# Patient Record
Sex: FEMALE | Race: BLACK OR AFRICAN AMERICAN | Employment: FULL TIME | URBAN - METROPOLITAN AREA
[De-identification: names, ages, dates, MRNs, and addresses within clinical notes are randomized per-mention and may not be internally consistent; named-entity substitution may affect disease eponyms.]

---

## 2017-06-06 ENCOUNTER — HOSPITAL ENCOUNTER (EMERGENCY)
Facility: HOSPITAL | Age: 37
Discharge: HOME | End: 2017-06-07
Attending: EMERGENCY MEDICINE
Payer: COMMERCIAL

## 2017-06-06 DIAGNOSIS — G43.809 OTHER TYPE OF MIGRAINE: Primary | ICD-10-CM

## 2017-06-06 PROCEDURE — 96365 THER/PROPH/DIAG IV INF INIT: CPT

## 2017-06-06 PROCEDURE — 99284 EMERGENCY DEPT VISIT MOD MDM: CPT

## 2017-06-06 PROCEDURE — 96375 TX/PRO/DX INJ NEW DRUG ADDON: CPT

## 2017-06-06 PROCEDURE — 81025 URINE PREGNANCY TEST: CPT

## 2017-06-06 PROCEDURE — 96372 THER/PROPH/DIAG INJ SC/IM: CPT

## 2017-06-06 RX ORDER — DIPHENHYDRAMINE HYDROCHLORIDE 50 MG/ML
25 INJECTION INTRAMUSCULAR; INTRAVENOUS ONCE
Status: COMPLETED | OUTPATIENT
Start: 2017-06-06 | End: 2017-06-06

## 2017-06-06 RX ORDER — SUMATRIPTAN 6 MG/.5ML
6 INJECTION, SOLUTION SUBCUTANEOUS ONCE
Status: COMPLETED | OUTPATIENT
Start: 2017-06-06 | End: 2017-06-06

## 2017-06-06 RX ORDER — DEXAMETHASONE SODIUM PHOSPHATE 4 MG/ML
10 VIAL (ML) INJECTION ONCE
Status: COMPLETED | OUTPATIENT
Start: 2017-06-06 | End: 2017-06-06

## 2017-06-06 RX ORDER — METOCLOPRAMIDE HYDROCHLORIDE 5 MG/ML
10 INJECTION INTRAMUSCULAR; INTRAVENOUS ONCE
Status: COMPLETED | OUTPATIENT
Start: 2017-06-06 | End: 2017-06-06

## 2017-06-06 RX ORDER — TOPIRAMATE 50 MG/1
50 TABLET, FILM COATED ORAL 2 TIMES DAILY
COMMUNITY

## 2017-06-07 VITALS
WEIGHT: 280 LBS | BODY MASS INDEX: 40.09 KG/M2 | SYSTOLIC BLOOD PRESSURE: 128 MMHG | HEART RATE: 49 BPM | HEIGHT: 70 IN | TEMPERATURE: 98 F | RESPIRATION RATE: 15 BRPM | OXYGEN SATURATION: 95 % | DIASTOLIC BLOOD PRESSURE: 76 MMHG

## 2017-06-07 NOTE — ED INITIAL ASSESSMENT (HPI)
Pt ambulatory to er cc headache since yesterday - took norco 7.5 last dose at 1630 today.  Interm nausea+  Ha -10/10

## 2017-06-07 NOTE — ED PROVIDER NOTES
Patient Seen in: [facility] Emergency Department    History   Patient presents with:  Headache (neurologic)  Nausea/Vomiting/Diarrhea (gastrointestinal)    Stated Complaint: headache    HPI    Patient is a 29-year-old female comes in emergency room Giovanna Suarez Mother      ARTHRISIS, OSTEOPOROSIS   • Other[other] [OTHER] Sister      LUPUS   • Giovanna Suarez Sister      MS   • OTHER[other] [OTHER] Sister      CHIARI MALFORMATION         Smoking Status: Never Smoker                      S normal sensation. Cranial nerves grossly intact II-XII. Speech intact.     ED Course     Labs Reviewed   POCT PREGNANCY, URINE     Medications   dexamethasone Sodium Phosphate (DECADRON) 4 MG/ML injection 10 mg (10 mg Intravenous Given 6/6/17 2899)   Valp for worsening, concerning, new, changing or persisting symptoms. I discussed the case with the patient and they had no questions, complaints, or concerns. Patient felt comfortable going home.           Disposition and Plan     Clinical Impression:  Other

## 2017-06-07 NOTE — ED NOTES
Patient is resting comfortably - reports her ha is still at 9/10 - ermd updated - pt states her ride will be here in approx 10 minutes.

## 2024-03-24 ENCOUNTER — HOSPITAL ENCOUNTER (INPATIENT)
Facility: HOSPITAL | Age: 44
LOS: 3 days | Discharge: HOME | End: 2024-03-27
Attending: STUDENT IN AN ORGANIZED HEALTH CARE EDUCATION/TRAINING PROGRAM | Admitting: HOSPITALIST

## 2024-03-24 ENCOUNTER — APPOINTMENT (OUTPATIENT)
Dept: MRI IMAGING | Facility: HOSPITAL | Age: 44
End: 2024-03-24
Attending: STUDENT IN AN ORGANIZED HEALTH CARE EDUCATION/TRAINING PROGRAM

## 2024-03-24 DIAGNOSIS — G95.0 SYRINGOHYDROMYELIA (HCC): ICD-10-CM

## 2024-03-24 DIAGNOSIS — D50.9 IRON DEFICIENCY ANEMIA, UNSPECIFIED IRON DEFICIENCY ANEMIA TYPE: Primary | ICD-10-CM

## 2024-03-24 LAB
ALBUMIN SERPL-MCNC: 3.9 G/DL (ref 3.4–5)
ALBUMIN/GLOB SERPL: 1.1 {RATIO} (ref 1–2)
ALP LIVER SERPL-CCNC: 92 U/L
ALT SERPL-CCNC: 15 U/L
ANION GAP SERPL CALC-SCNC: 6 MMOL/L (ref 0–18)
ANTIBODY SCREEN: NEGATIVE
AST SERPL-CCNC: 20 U/L (ref 15–37)
B-HCG UR QL: NEGATIVE
BASOPHILS # BLD AUTO: 0.06 X10(3) UL (ref 0–0.2)
BASOPHILS NFR BLD AUTO: 1 %
BILIRUB SERPL-MCNC: 0.5 MG/DL (ref 0.1–2)
BUN BLD-MCNC: 8 MG/DL (ref 9–23)
CALCIUM BLD-MCNC: 8.9 MG/DL (ref 8.5–10.1)
CHLORIDE SERPL-SCNC: 115 MMOL/L (ref 98–112)
CO2 SERPL-SCNC: 22 MMOL/L (ref 21–32)
CREAT BLD-MCNC: 0.71 MG/DL
EGFRCR SERPLBLD CKD-EPI 2021: 109 ML/MIN/1.73M2 (ref 60–?)
EOSINOPHIL # BLD AUTO: 0.11 X10(3) UL (ref 0–0.7)
EOSINOPHIL NFR BLD AUTO: 1.8 %
ERYTHROCYTE [DISTWIDTH] IN BLOOD BY AUTOMATED COUNT: 30.6 %
GLOBULIN PLAS-MCNC: 3.5 G/DL (ref 2.8–4.4)
GLUCOSE BLD-MCNC: 95 MG/DL (ref 70–99)
HCT VFR BLD AUTO: 19.8 %
HGB BLD-MCNC: 5.3 G/DL
IMM GRANULOCYTES # BLD AUTO: 0.04 X10(3) UL (ref 0–1)
IMM GRANULOCYTES NFR BLD: 0.6 %
INR BLD: 1.14 (ref 0.8–1.2)
LYMPHOCYTES # BLD AUTO: 1.9 X10(3) UL (ref 1–4)
LYMPHOCYTES NFR BLD AUTO: 30.3 %
MCH RBC QN AUTO: 16.8 PG (ref 26–34)
MCHC RBC AUTO-ENTMCNC: 26.8 G/DL (ref 31–37)
MCV RBC AUTO: 62.7 FL
MONOCYTES # BLD AUTO: 0.62 X10(3) UL (ref 0.1–1)
MONOCYTES NFR BLD AUTO: 9.9 %
NEUTROPHILS # BLD AUTO: 3.55 X10 (3) UL (ref 1.5–7.7)
NEUTROPHILS # BLD AUTO: 3.55 X10(3) UL (ref 1.5–7.7)
NEUTROPHILS NFR BLD AUTO: 56.4 %
OSMOLALITY SERPL CALC.SUM OF ELEC: 294 MOSM/KG (ref 275–295)
PLATELET # BLD AUTO: 973 10(3)UL (ref 150–450)
PLATELET MORPHOLOGY: NORMAL
PLATELETS.RETICULATED NFR BLD AUTO: 0.7 % (ref 0–7)
POTASSIUM SERPL-SCNC: 3.6 MMOL/L (ref 3.5–5.1)
PROT SERPL-MCNC: 7.4 G/DL (ref 6.4–8.2)
PROTHROMBIN TIME: 14.7 SECONDS (ref 11.6–14.8)
RBC # BLD AUTO: 3.16 X10(6)UL
RH BLOOD TYPE: POSITIVE
SODIUM SERPL-SCNC: 143 MMOL/L (ref 136–145)
WBC # BLD AUTO: 6.3 X10(3) UL (ref 4–11)

## 2024-03-24 PROCEDURE — 72148 MRI LUMBAR SPINE W/O DYE: CPT | Performed by: STUDENT IN AN ORGANIZED HEALTH CARE EDUCATION/TRAINING PROGRAM

## 2024-03-24 PROCEDURE — 30233N1 TRANSFUSION OF NONAUTOLOGOUS RED BLOOD CELLS INTO PERIPHERAL VEIN, PERCUTANEOUS APPROACH: ICD-10-PCS | Performed by: HOSPITALIST

## 2024-03-24 RX ORDER — METOCLOPRAMIDE HYDROCHLORIDE 5 MG/ML
10 INJECTION INTRAMUSCULAR; INTRAVENOUS ONCE
Status: COMPLETED | OUTPATIENT
Start: 2024-03-24 | End: 2024-03-24

## 2024-03-24 RX ORDER — HYDROCODONE BITARTRATE AND ACETAMINOPHEN 5; 325 MG/1; MG/1
1 TABLET ORAL EVERY 4 HOURS PRN
Status: DISCONTINUED | OUTPATIENT
Start: 2024-03-24 | End: 2024-03-27

## 2024-03-24 RX ORDER — BISACODYL 10 MG
10 SUPPOSITORY, RECTAL RECTAL
Status: DISCONTINUED | OUTPATIENT
Start: 2024-03-24 | End: 2024-03-27

## 2024-03-24 RX ORDER — PROCHLORPERAZINE EDISYLATE 5 MG/ML
5 INJECTION INTRAMUSCULAR; INTRAVENOUS EVERY 8 HOURS PRN
Status: DISCONTINUED | OUTPATIENT
Start: 2024-03-24 | End: 2024-03-27

## 2024-03-24 RX ORDER — SODIUM CHLORIDE, SODIUM LACTATE, POTASSIUM CHLORIDE, CALCIUM CHLORIDE 600; 310; 30; 20 MG/100ML; MG/100ML; MG/100ML; MG/100ML
INJECTION, SOLUTION INTRAVENOUS CONTINUOUS
Status: DISCONTINUED | OUTPATIENT
Start: 2024-03-24 | End: 2024-03-27

## 2024-03-24 RX ORDER — MAGNESIUM SULFATE 1 G/100ML
1 INJECTION INTRAVENOUS ONCE
Status: COMPLETED | OUTPATIENT
Start: 2024-03-24 | End: 2024-03-24

## 2024-03-24 RX ORDER — MORPHINE SULFATE 2 MG/ML
2 INJECTION, SOLUTION INTRAMUSCULAR; INTRAVENOUS EVERY 2 HOUR PRN
Status: DISCONTINUED | OUTPATIENT
Start: 2024-03-24 | End: 2024-03-27

## 2024-03-24 RX ORDER — MORPHINE SULFATE 4 MG/ML
4 INJECTION, SOLUTION INTRAMUSCULAR; INTRAVENOUS ONCE
Status: COMPLETED | OUTPATIENT
Start: 2024-03-24 | End: 2024-03-24

## 2024-03-24 RX ORDER — HYDROCODONE BITARTRATE AND ACETAMINOPHEN 5; 325 MG/1; MG/1
2 TABLET ORAL EVERY 4 HOURS PRN
Status: DISCONTINUED | OUTPATIENT
Start: 2024-03-24 | End: 2024-03-27

## 2024-03-24 RX ORDER — SENNOSIDES 8.6 MG
17.2 TABLET ORAL NIGHTLY PRN
Status: DISCONTINUED | OUTPATIENT
Start: 2024-03-24 | End: 2024-03-27

## 2024-03-24 RX ORDER — ONDANSETRON 2 MG/ML
4 INJECTION INTRAMUSCULAR; INTRAVENOUS EVERY 6 HOURS PRN
Status: DISCONTINUED | OUTPATIENT
Start: 2024-03-24 | End: 2024-03-27

## 2024-03-24 RX ORDER — ENEMA 19; 7 G/133ML; G/133ML
1 ENEMA RECTAL ONCE AS NEEDED
Status: DISCONTINUED | OUTPATIENT
Start: 2024-03-24 | End: 2024-03-27

## 2024-03-24 RX ORDER — ACETAMINOPHEN 325 MG/1
650 TABLET ORAL EVERY 4 HOURS PRN
Status: DISCONTINUED | OUTPATIENT
Start: 2024-03-24 | End: 2024-03-27

## 2024-03-24 RX ORDER — DIPHENHYDRAMINE HYDROCHLORIDE 50 MG/ML
25 INJECTION INTRAMUSCULAR; INTRAVENOUS ONCE
Status: COMPLETED | OUTPATIENT
Start: 2024-03-24 | End: 2024-03-24

## 2024-03-24 RX ORDER — MORPHINE SULFATE 4 MG/ML
4 INJECTION, SOLUTION INTRAMUSCULAR; INTRAVENOUS EVERY 2 HOUR PRN
Status: DISCONTINUED | OUTPATIENT
Start: 2024-03-24 | End: 2024-03-27

## 2024-03-24 RX ORDER — TOPIRAMATE 25 MG/1
50 TABLET ORAL 2 TIMES DAILY
Status: DISCONTINUED | OUTPATIENT
Start: 2024-03-24 | End: 2024-03-27

## 2024-03-24 RX ORDER — POLYETHYLENE GLYCOL 3350 17 G/17G
17 POWDER, FOR SOLUTION ORAL DAILY PRN
Status: DISCONTINUED | OUTPATIENT
Start: 2024-03-24 | End: 2024-03-27

## 2024-03-24 RX ORDER — DEXAMETHASONE SODIUM PHOSPHATE 10 MG/ML
10 INJECTION, SOLUTION INTRAMUSCULAR; INTRAVENOUS ONCE
Status: COMPLETED | OUTPATIENT
Start: 2024-03-24 | End: 2024-03-24

## 2024-03-24 RX ORDER — TOPIRAMATE 100 MG/1
100 TABLET, FILM COATED ORAL 2 TIMES DAILY
COMMUNITY

## 2024-03-24 RX ORDER — MELATONIN
3 NIGHTLY PRN
Status: DISCONTINUED | OUTPATIENT
Start: 2024-03-24 | End: 2024-03-27

## 2024-03-24 RX ORDER — MORPHINE SULFATE 2 MG/ML
1 INJECTION, SOLUTION INTRAMUSCULAR; INTRAVENOUS EVERY 2 HOUR PRN
Status: DISCONTINUED | OUTPATIENT
Start: 2024-03-24 | End: 2024-03-27

## 2024-03-24 RX ORDER — GABAPENTIN 600 MG/1
1200 TABLET ORAL 3 TIMES DAILY
COMMUNITY

## 2024-03-24 NOTE — ED PROVIDER NOTES
Patient Seen in: [facility] Emergency Department      History     Chief Complaint   Patient presents with    Back Pain    Headache     Stated Complaint: headache and bacck pain    Subjective:   HPI    The patient is a 43-year-old female with a history of Chiari malformation, migraines peripheral neuropathy presented to the emergency room with reports of migraine for the last 3 days.  Symptoms were not maximal at onset.  She states the headache is diffusely on top of her head.  She has had no changes in her vision but does note photophobia.  She has had no nausea or vomiting and denies any neck stiffness or fevers.  Patient also states that she started having back pain before the onset of her migraine.  She states she feels like she is being ripped apart.  She notes that she has tingling down her legs but tells me she has peripheral neuropathy.  Patient tells me she had 1 episode of incontinence which is very unusual for her.  As it relates her headaches she is try Topamax which she usually takes but that has not provided her with any relief.  She also tried Sudafed thinking that she had a sinus issue but that also did not help.    In the past patient had to get fluid drained secondary to her Chiari malformation.  She states her headache does not feel like she needs fluid drained.  When that typically happens she is unable to move her neck and she has pressure in the back of her head which is not consistent with her symptoms this time around.    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   Tobacco Use    Smoking status: Never    Smokeless tobacco: Never   Vaping Use    Vaping Use: Never used   Substance and Sexual Activity    Alcohol use: No    Drug use: No   Other Topics Concern    Caffeine Concern No     Comment: occasional soda    Exercise No     Social Determinants of Health     Food Insecurity: No Food Insecurity (3/24/2024)    Food Insecurity     Food Insecurity: Never true   Transportation Needs: No Transportation Needs (3/24/2024)    Transportation Needs     Lack of Transportation: No   Housing Stability: Low Risk  (3/24/2024)    Housing Stability     Housing Instability: No              Review of Systems    Positive for stated complaint: headache and bacck pain  Other systems are as noted in HPI.  Constitutional and vital signs reviewed.      All other systems reviewed and negative except as noted above.    Physical Exam     ED Triage Vitals [03/24/24 1454]   /70   Pulse 85   Resp 18   Temp 97.2 °F (36.2 °C)   Temp src Temporal   SpO2 97 %   O2 Device None (Room air)       Current:/63   Pulse 60   Temp 97.8 °F (36.6 °C) (Oral)   Resp 17   Wt 108 kg   SpO2 100%   BMI 36.19 kg/m²         Physical Exam  Vitals and nursing note reviewed.   Constitutional:       Comments: She is lying in the stretcher with her eyes closed.   HENT:      Head: Normocephalic and atraumatic.   Eyes:      Extraocular Movements: Extraocular movements intact.      Pupils: Pupils are equal, round, and reactive to light.      Comments: Conjunctiva is pale   Cardiovascular:      Rate and Rhythm: Normal rate and regular rhythm.      Pulses: Normal pulses.      Heart sounds: Normal heart sounds.   Pulmonary:      Effort: Pulmonary effort is normal. No respiratory distress.      Breath sounds: Normal breath sounds. No wheezing.   Abdominal:      General: Abdomen is  flat. There is no distension.      Tenderness: There is no abdominal tenderness.   Musculoskeletal:         General: Normal range of motion.      Comments: There is tenderness to palpation along the lumbar spine   Skin:     General: Skin is warm.   Neurological:      General: No focal deficit present.      Mental Status: She is oriented to person, place, and time.   Psychiatric:         Mood and Affect: Mood normal.         Behavior: Behavior normal.               ED Course     Labs Reviewed   COMP METABOLIC PANEL (14) - Abnormal; Notable for the following components:       Result Value    Chloride 115 (*)     BUN 8 (*)     All other components within normal limits   RBC MORPHOLOGY SCAN - Abnormal; Notable for the following components:    RBC Morphology See morphology below (*)     Hypochromia 2+ (*)     All other components within normal limits   CBC W/ DIFFERENTIAL - Abnormal; Notable for the following components:    RBC 3.16 (*)     HGB 5.3 (*)     HCT 19.8 (*)     .0 (*)     MCV 62.7 (*)     MCH 16.8 (*)     MCHC 26.8 (*)     All other components within normal limits   PROTHROMBIN TIME (PT) - Normal   POCT PREGNANCY URINE - Normal   CBC WITH DIFFERENTIAL WITH PLATELET    Narrative:     The following orders were created for panel order CBC With Differential With Platelet.  Procedure                               Abnormality         Status                     ---------                               -----------         ------                     CBC W/ DIFFERENTIAL[[id number]]          Abnormal            Final result                 Please view results for these tests on the individual orders.   PATH COMMENT CBC   TYPE AND SCREEN    Narrative:     The following orders were created for panel order Type and screen.  Procedure                               Abnormality         Status                     ---------                               -----------         ------                     ABORH (Blood  Type)[[id number]]                               Final result               Antibody Screen[[id number]]                                  Final result                 Please view results for these tests on the individual orders.   ABORH (BLOOD TYPE)   ANTIBODY SCREEN   PREPARE RBC   RAINBOW DRAW LAVENDER   RAINBOW DRAW LIGHT GREEN   RAINBOW DRAW BLUE     MRI SPINE LUMBAR (CPT=72148)    Result Date: 3/24/2024  CONCLUSION:  1. 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  This has progressed significantly since prior cervical spine MRI and could be the cause of neurologic symptoms.  This patient has had a prior posterior decompression surgery for Chiari malformation and correlation with known surgical and clinical history is necessary.  If indicated additional evaluation of cervical spine should be considered. 2. There is multilevel degenerative disc disease with multiple disc protrusions at levels in lumbar spine described above. 3. There is significant degenerative disc disease at the T10-T11 level with moderate central canal stenosis at that level.    LOCATION:  [facility]   Dictated by (CST): Shane Ramirez MD on 3/24/2024 at 7:33 PM     Finalized by (CST): Shane Ramirez MD on 3/24/2024 at 7:39 PM                 MDM      The differential includes the following  Migraine headache, sinus infection, tension headache,  As relates to the patient's back pain differential includes lumbar strain, cord compression which is a life threat    Pertinent comorbidities include  As detailed above in the HPI    Pertinent social history includes    ER course  The patient is afebrile and hemodynamically stable as well as in no acute distress.  She does not have any focal deficits on exam.  She does appear uncomfortable.  I believe her headache is likely consistent with migraines but I am concerned about her  back pain and reports of bladder incontinence.  I will be obtaining a MRI of the lumbar spine.  Patient will be given a migraine cocktail consisting of Reglan, Benadryl, Decadron, Depakote and magnesium.  She will also be given IV fluids.  Patient is unable to receive NSAIDs because of history of gastric bypass.  Now on exam patient also has very pale conjunctiva.  She denies any history of black stools or bleeding or heavy menses.  She states she has needed a blood transfusion in the past.    Ultimately patient underwent an MRI of her lumbar spine which showed progressive syringohydromyelia.  This was discussed with neurosurgery Dr. Russo.  Who is recommending MRI of brain and C-spine.     2 units of blood has been ordered.  Patient has started to receive the first unit of blood.  I explained her need for admission.  She complained of worsening back pain therefore she was given 4 mg of morphine..    Labs  Hemoglobin is 5.5.  Hemoglobin from last October was 8.    Imaging studies  I reviewed the images and agree with the radiologist report that showed the following there is syringohydromyelia    External data reviewed  Prior results of MRI from 2012 and 2013.    Discussion of management with external providers  Case discussed with neurosurgery as well as the [facility] hospitalist.  Admission disposition: 3/24/2024 10:47 PM         A total of 35 minutes of critical care time (exclusive of billable procedures) was administered to manage the patient's critical lab values and critical imaging findings due to her anemia requiring blood transfusion and syrinx on MRI requiring additional studies..  This involved direct patient intervention, complex decision making, and/or extensive discussions with the patient, family, and clinical staff.                                 Medical Decision Making      Disposition and Plan     Clinical Impression:  1. Iron deficiency anemia, unspecified iron deficiency anemia type    2.  Syringohydromyelia (HCC)         Disposition:  Admit  3/24/2024 10:47 pm    Follow-up:  No follow-up provider specified.        Medications Prescribed:  Current Discharge Medication List                            Hospital Problems       Present on Admission  Date Reviewed: 9/21/2021            ICD-10-CM Noted POA    Anemia D64.9 11/18/2013 Unknown

## 2024-03-25 LAB
ALBUMIN SERPL-MCNC: 3.6 G/DL (ref 3.4–5)
ALBUMIN/GLOB SERPL: 1 {RATIO} (ref 1–2)
ALP LIVER SERPL-CCNC: 92 U/L
ALT SERPL-CCNC: 11 U/L
ANION GAP SERPL CALC-SCNC: 7 MMOL/L (ref 0–18)
AST SERPL-CCNC: 9 U/L (ref 15–37)
BASOPHILS # BLD AUTO: 0.01 X10(3) UL (ref 0–0.2)
BASOPHILS NFR BLD AUTO: 0.1 %
BILIRUB SERPL-MCNC: 0.7 MG/DL (ref 0.1–2)
BUN BLD-MCNC: 5 MG/DL (ref 9–23)
CALCIUM BLD-MCNC: 9 MG/DL (ref 8.5–10.1)
CHLORIDE SERPL-SCNC: 114 MMOL/L (ref 98–112)
CO2 SERPL-SCNC: 23 MMOL/L (ref 21–32)
CREAT BLD-MCNC: 0.74 MG/DL
EGFRCR SERPLBLD CKD-EPI 2021: 104 ML/MIN/1.73M2 (ref 60–?)
EOSINOPHIL # BLD AUTO: 0.01 X10(3) UL (ref 0–0.7)
EOSINOPHIL NFR BLD AUTO: 0.1 %
ERYTHROCYTE [DISTWIDTH] IN BLOOD BY AUTOMATED COUNT: 31.8 %
GLOBULIN PLAS-MCNC: 3.5 G/DL (ref 2.8–4.4)
GLUCOSE BLD-MCNC: 101 MG/DL (ref 70–99)
HCT VFR BLD AUTO: 25.1 %
HGB BLD-MCNC: 7.1 G/DL
HGB BLD-MCNC: 7.2 G/DL
IMM GRANULOCYTES # BLD AUTO: 0.02 X10(3) UL (ref 0–1)
IMM GRANULOCYTES NFR BLD: 0.3 %
LYMPHOCYTES # BLD AUTO: 1.57 X10(3) UL (ref 1–4)
LYMPHOCYTES NFR BLD AUTO: 19.8 %
MAGNESIUM SERPL-MCNC: 2.3 MG/DL (ref 1.6–2.6)
MCH RBC QN AUTO: 19.5 PG (ref 26–34)
MCHC RBC AUTO-ENTMCNC: 28.7 G/DL (ref 31–37)
MCV RBC AUTO: 68 FL
MONOCYTES # BLD AUTO: 0.74 X10(3) UL (ref 0.1–1)
MONOCYTES NFR BLD AUTO: 9.3 %
NEUTROPHILS # BLD AUTO: 5.58 X10 (3) UL (ref 1.5–7.7)
NEUTROPHILS # BLD AUTO: 5.58 X10(3) UL (ref 1.5–7.7)
NEUTROPHILS NFR BLD AUTO: 70.4 %
OSMOLALITY SERPL CALC.SUM OF ELEC: 295 MOSM/KG (ref 275–295)
PLATELET # BLD AUTO: 957 10(3)UL (ref 150–450)
POTASSIUM SERPL-SCNC: 4.2 MMOL/L (ref 3.5–5.1)
PROT SERPL-MCNC: 7.1 G/DL (ref 6.4–8.2)
RBC # BLD AUTO: 3.69 X10(6)UL
SODIUM SERPL-SCNC: 144 MMOL/L (ref 136–145)
WBC # BLD AUTO: 7.9 X10(3) UL (ref 4–11)

## 2024-03-25 RX ORDER — BUTALBITAL, ACETAMINOPHEN AND CAFFEINE 50; 325; 40 MG/1; MG/1; MG/1
1 TABLET ORAL EVERY 4 HOURS PRN
Status: DISCONTINUED | OUTPATIENT
Start: 2024-03-25 | End: 2024-03-27

## 2024-03-25 NOTE — CONSULTS
[facility]  AFTAB Neurosurgery Consult    Anna Galindo Patient Status:  Inpatient    1980 MRN [id number]   Location [facility] 7NE-A Attending Haley Ruvalcaba*   Hosp Day # 1 PCP Yenifer Major MD     REASON FOR CONSULTATION:  C6-T2 syrinx, low back pain    HISTORY OF PRESENT ILLNESS     Anna Galindo is a pleasant 43 year old female with PMH of Chiari malformation (s/p suboccipital craniotomy for decompression by Dr. Brito at [place] in ) who presented to ED with c/o migraine, low back pain, and one episode of urinary incontinence. Patient endorsed a migraine with photophobia, duration 3 days, not improving with OTC medications. She also reported mid LBP with radiation down BLE into the feet, with numbness/tingling in this distribution. She presented to ED where MRI lumbar spine was obtained in setting of intractable LBP with LE radiculopathy and urinary incontinence.  view demonstrates significant syringohydromyelia extending from C6-T2, which has progressed since prior cervical spine MRI, and isolated signal at C4 unchanged from previous imaging.  Neurosurgery is consulted for this. Pt was also found to be anemic with a hemoglobin of 5.5, for which she has been transfused with PRBCs.     Patient was seen and examined with Dr. Devries. On exam, patient is moving all extremities freely. Continues to report paresthesias to BLE, however has peripheral neuropathy at baseline. She is otherwise neurologically intact.     PAST MEDICAL HISTORY     Past Medical History:   Diagnosis Date    Allergic rhinitis     Anemia     B12 deficiency     Chiari malformation     Iron deficiency     Migraines     Peripheral neuropathy     Vitamin D deficiency      PAST SURGICAL HISTORY:  Past Surgical History:   Procedure Laterality Date    BRAIN SURGERY      chiari malformation     DELIVERY ONLY      two c/s    GASTRIC BYPASS,OBESITY,SB RECONSTRUC      IR ANGIOPLASTY      15,  done at [facility]    REMOVAL OF TONSILS,12+ Y/O       FAMILY HISTORY:  family history includes Diabetes in her father and mother; Hypertension in her mother; Other in her mother, sister, sister, and sister.    SOCIAL HISTORY:   reports that she has never smoked. She has never used smokeless tobacco. She reports that she does not drink alcohol and does not use drugs.    ALLERGIES     Allergies   Allergen Reactions    Motrin [Ibuprofen]      Avoids due to hx of gastric bypass    Tramadol OTHER (SEE COMMENTS)     headache     MEDICATIONS     Medications Prior to Admission   Medication Sig Dispense Refill Last Dose    topiramate 100 MG Oral Tab Take 1 tablet (100 mg total) by mouth 2 (two) times daily.   3/24/2024    gabapentin 600 MG Oral Tab Take 2 tablets (1,200 mg total) by mouth 3 (three) times daily.   3/24/2024    ergocalciferol 1.25 MG (92838 UT) Oral Cap Take 1 capsule (50,000 Units total) by mouth every 7 days. (Patient not taking: Reported on 3/24/2024) 12 capsule 0 Not Taking    Ferrous Sulfate (IRON) 325 (65 Fe) MG Oral Tab Take 1 tablet by mouth daily. (Patient not taking: Reported on 3/24/2024) 90 tablet 0 Not Taking    topiramate 50 MG Oral Tab Take 50 mg by mouth 2 (two) times daily.       HYDROcodone-acetaminophen (NORCO) 5-325 MG Oral Tab as needed.  0     gabapentin (NEURONTIN) 600 MG Oral Tab Take 1 tablet 3 times daily, in addition to 400 mg tablet to equal 1000 mg TID. 90 tablet 6     gabapentin (NEURONTIN) 400 MG Oral Cap Take 1 tablet 3 times daily, in addition to 600 mg tablet to equal 1000 mg TID. 90 capsule 6      Current Facility-Administered Medications   Medication Dose Route Frequency    gabapentin (Neurontin) cap 1,000 mg  1,000 mg Oral TID    topiramate (TopaMAX) tab 50 mg  50 mg Oral BID    melatonin tab 3 mg  3 mg Oral Nightly PRN    polyethylene glycol (PEG 3350) (Miralax) 17 g oral packet 17 g  17 g Oral Daily PRN    sennosides (Senokot) tab 17.2 mg  17.2 mg Oral Nightly PRN    bisacodyl  (Dulcolax) 10 MG rectal suppository 10 mg  10 mg Rectal Daily PRN    fleet enema (Fleet) 7-19 GM/118ML rectal enema 133 mL  1 enema Rectal Once PRN    ondansetron (Zofran) 4 MG/2ML injection 4 mg  4 mg Intravenous Q6H PRN    prochlorperazine (Compazine) 10 MG/2ML injection 5 mg  5 mg Intravenous Q8H PRN    lactated ringers infusion   Intravenous Continuous    acetaminophen (Tylenol) tab 650 mg  650 mg Oral Q4H PRN    Or    HYDROcodone-acetaminophen (Norco) 5-325 MG per tab 1 tablet  1 tablet Oral Q4H PRN    Or    HYDROcodone-acetaminophen (Norco) 5-325 MG per tab 2 tablet  2 tablet Oral Q4H PRN    morphINE PF 2 MG/ML injection 1 mg  1 mg Intravenous Q2H PRN    Or    morphINE PF 2 MG/ML injection 2 mg  2 mg Intravenous Q2H PRN    Or    morphINE PF 4 MG/ML injection 4 mg  4 mg Intravenous Q2H PRN     Facility-Administered Medications Ordered in Other Encounters   Medication Dose Route Frequency    gadobutrol (GADOVIST) 1 MMOL/ML injection 10 mL  10 mmol Intravenous ONCE PRN     REVIEW OF SYSTEMS     Comprehensive Review of Systems obtained, and is negative other than that mentioned in the History of Present Illness.      PHYSICAL EXAMINATION     VITALS: /67 (BP Location: Left arm)   Pulse 70   Temp 98.2 °F (36.8 °C) (Oral)   Resp 16   Wt 238 lb (108 kg)   SpO2 100%   BMI 36.19 kg/m²     GENERAL:  No acute distress, non-toxic appearing, speech fluent, mood appropriate    HEENT:  Normocephalic, atraumatic    RESP: Non-labored, easy, even    CV: NSR on tele    NEUROLOGICAL:  Alert and oriented x 3. Sensation to light touch is intact bilaterally.  SIERRA x 4. Gait deferred.       DIAGNOSTIC DATA     Lab Results   Component Value Date    WBC 7.9 03/25/2024    HGB 7.2 03/25/2024    HCT 25.1 03/25/2024    .0 03/25/2024    CREATSERUM 0.74 03/25/2024    BUN 5 03/25/2024     03/25/2024    K 4.2 03/25/2024     03/25/2024    CO2 23.0 03/25/2024     03/25/2024    CA 9.0 03/25/2024    ALB 3.6  03/25/2024    ALKPHO 92 03/25/2024    BILT 0.7 03/25/2024    TP 7.1 03/25/2024    AST 9 03/25/2024    ALT 11 03/25/2024    INR 1.14 03/24/2024    PTP 14.7 03/24/2024    MG 2.3 03/25/2024       IMAGING     MRI SPINE LUMBAR (CPT=72148)    Result Date: 3/24/2024  CONCLUSION:  1. 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  This has progressed significantly since prior cervical spine MRI and could be the cause of neurologic symptoms.  This patient has had a prior posterior decompression surgery for Chiari malformation and correlation with known surgical and clinical history is necessary.  If indicated additional evaluation of cervical spine should be considered. 2. There is multilevel degenerative disc disease with multiple disc protrusions at levels in lumbar spine described above. 3. There is significant degenerative disc disease at the T10-T11 level with moderate central canal stenosis at that level.    LOCATION:  [place]   Dictated by (CST): Shane Ramirez MD on 3/24/2024 at 7:33 PM     Finalized by (CST): Shane Ramirez MD on 3/24/2024 at 7:39 PM         ASSESSMENT & PLAN     ASSESSMENT:  Cervicothoracic syringohydromyelia   Low back pain with BLE radiculopathy  Anemia     PLAN:  No acute surgical intervention is indicated at this time  Obtain MRI cervical + thoracic spine W+WO   Obtain MRI brain W+WO  Further recommendations to follow imaging   Medical management per hospitalist    Plan was discussed with Dr. Devries. Thank you very much for the consult.    Erica Webb, APRN-NP  [facility]  3/25/2024 8:54 AM     Total visit time: 10 minutes; More than 50% spent coordinating care, counseling, reviewing imaging and discussing medication therapy.   Is this a shared or split note between Advanced Practice Provider and Physician? Yes

## 2024-03-25 NOTE — H&P
[facility] History and Physical      Chief Complaint   Patient presents with    Back Pain    Headache        PCP: Yenifer Major MD      History of Present Illness: Patient is a 43 year old female with PMH sig for Chiari malformation, migraines, peripheral neuropathy, anemia p/w headache and low back pain. Started about a week ago and also associated with photophobia. Pain radiates down both legs and feels like an electrical shock. Denies visual changes/deficits. Denies N/V. No fevers or neck stiffness. Also reporting urinary incontinence episodes for more than a month. Tried taking topamax with no relief.   In the ED her VSS. Labs with Hgb of 5.3 with microcytosis, platelets of 973. 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 NSG consulted, ordered blood transferusions and admitted for further evaluation and treatment.     Past Medical History:   Diagnosis Date    Allergic rhinitis     Anemia     B12 deficiency     Chiari malformation     Iron deficiency     Migraines     Peripheral neuropathy     Vitamin D deficiency       Past Surgical History:   Procedure Laterality Date    BRAIN SURGERY      chiari malformation     DELIVERY ONLY      two c/s    GASTRIC BYPASS,OBESITY,SB RECONSTRUC      IR ANGIOPLASTY      15, done at [facility]    REMOVAL OF TONSILS,12+ Y/O          ALL:  Allergies   Allergen Reactions    Motrin [Ibuprofen]      Avoids due to hx of gastric bypass    Tramadol OTHER (SEE COMMENTS)     headache        No current outpatient medications on file.       Social History     Tobacco Use    Smoking status: Never    Smokeless tobacco: Never   Substance Use Topics    Alcohol use: No        Fam Hx  Family History   Problem Relation Age of Onset    Diabetes Father     Diabetes Mother     Hypertension Mother     Other (Other) Mother         ARTHRISIS, OSTEOPOROSIS    Other  (Other) Sister         LUPUS    Other (Other) Sister         MS    Other (Other) Sister         CHIARI MALFORMATION       Review of Systems  Comprehensive ROS reviewed and negative except for what is stated in HPI.      OBJECTIVE:  /69 (BP Location: Left arm)   Pulse 88   Temp 98.2 °F (36.8 °C) (Oral)   Resp 21   Wt 238 lb (108 kg)   SpO2 100%   BMI 36.19 kg/m²   General:  Alert, no distress, appears stated age.    Head:  Normocephalic, without obvious abnormality, atraumatic.   Eyes:  Sclera anicteric, No conjunctival pallor, EOMs intact.    Nose: Nares normal. Septum midline. Mucosa normal. No drainage.   Throat: Lips, mucosa, and tongue normal. Teeth and gums normal.   Neck: Supple, symmetrical, trachea midline, no cervical or supraclavicular lymph adenopathy, thyroid: no enlargment/tenderness/nodules appreciated   Lungs:   Clear to auscultation bilaterally. Normal effort   Chest wall:  No tenderness or deformity.   Heart:  Regular rate and rhythm, S1, S2 normal, no murmur, rub or gallop appreciated   Abdomen:   Soft, non-tender. Bowel sounds normal. No masses,  No organomegaly. Non distended   Extremities: Extremities normal, atraumatic, no cyanosis or edema.   Skin: Skin color, texture, turgor normal. No rashes or lesions.    Neurologic: Normal strength, no focal deficit appreciated     Data Review:    LABS:   Lab Results   Component Value Date    WBC 7.9 03/25/2024    HGB 7.2 03/25/2024    HCT 25.1 03/25/2024    .0 03/25/2024    CREATSERUM 0.74 03/25/2024    BUN 5 03/25/2024     03/25/2024    K 4.2 03/25/2024     03/25/2024    CO2 23.0 03/25/2024     03/25/2024    CA 9.0 03/25/2024    ALB 3.6 03/25/2024    ALKPHO 92 03/25/2024    BILT 0.7 03/25/2024    TP 7.1 03/25/2024    AST 9 03/25/2024    ALT 11 03/25/2024    INR 1.14 03/24/2024    PTP 14.7 03/24/2024    MG 2.3 03/25/2024       CXR: All imaging personally reviewed.      Radiology: MRI SPINE LUMBAR (OPO=86039)    Result  Date: 3/24/2024  PROCEDURE:  MRI SPINE LUMBAR (CPT=72148)  COMPARISON:  [facility] , MR, SPINE CERV W WO CONTRAST MRI, 4/28/2012, 3:38 PM.  INDICATIONS:  lower back pain, no hx of trauma, b/l lower ext tingling and episode of incontinence  TECHNIQUE:  Multiplanar T1 and T2 weighted images including fat suppression sequences.  Images acquired in sagittal and axial planes.   PATIENT STATED HISTORY: (As transcribed by Technologist)  Patient complains of lower back pain x 1 week.    FINDINGS:  LUMBAR DISC LEVELS L1-L2:  No significant disc/facet abnormality, spinal stenosis, or foraminal narrowing. L2-L3:  Mild diffuse disc bulge is noted without significant stenosis of central canal or foramina. L3-L4:  There is diffuse disc bulge with central radial tear of the annulus.  There is mild broad-based disc protrusion.  There is no significant stenosis of central canal or foramina. L4-L5:  There is diffuse disc bulge.  There is central tear of the annulus.  There is central broad-based disc protrusion.  There is no significant central canal stenosis.  There is mild bilateral foraminal and subarticular stenosis. L5-S1:  Diffuse disc bulge with central radial tear of the annulus is noted.  There is central broad-based disc protrusion.  There is left parasagittal and foraminal protrusion.  There is no central canal stenosis.  There is moderate left subarticular and foraminal stenosis.  There is mild right subarticular and foraminal stenosis.  PARASPINAL AREA:  Normal with no visible mass.  BONY STRUCTURES:  No fracture, pars defect, significant scoliosis, or osseous lesion.  CORD/CAUDA EQUINA:  On the large field-of-view counting sequence there is noted to be extensive T2 hyperintensity within the central cord noted at the C4 level, C6-C7 level and T1-T2 level.  This most likely represents syringohydromyelia.  Findings have progressed significantly since prior cervical spine MRI from 2012. Note is also made on the large  field-of-view images of disc osteophyte complex at T10-T11 causing moderate central canal stenosis although no cord signal abnormality at this level.            CONCLUSION:  1. 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  This has progressed significantly since prior cervical spine MRI and could be the cause of neurologic symptoms.  This patient has had a prior posterior decompression surgery for Chiari malformation and correlation with known surgical and clinical history is necessary.  If indicated additional evaluation of cervical spine should be considered. 2. There is multilevel degenerative disc disease with multiple disc protrusions at levels in lumbar spine described above. 3. There is significant degenerative disc disease at the T10-T11 level with moderate central canal stenosis at that level.    LOCATION:  [facility]   Dictated by (CST): Shane Ramirez MD on 3/24/2024 at 7:33 PM     Finalized by (CST): Shane Ramirez MD on 3/24/2024 at 7:39 PM          Assessment/Plan:     43 year old female with PMH sig for Chiari malformation, migraines, peripheral neuropathy, anemia p/w headache and low back pain.     Cervical/thoracic syryingohydromyelia with back pain and ?urinary incontinence   - Hx Chiari malformation   - await further NSG recommendations  - repeat Cervical MRI  - prn analgesics    Acute microcytic anemia  Thrombocytosis  - no evidence of hemorrhage   - s/p 2u PRBC in ER  - monitor  - will consult hematology     Migraines  Peripheral neuropathy  - gabapentin TID    FEN: regular diet, PT/OT  Proph: SCDs  Code status: Full code     Outpatient records or previous hospital records reviewed.   [facility] hospitalist to continue to follow patient while in house      Magdalena Ruvalcaba MD  [facility]  Hospitalist  Message over [employer]  Pager: [phone]

## 2024-03-25 NOTE — OCCUPATIONAL THERAPY NOTE
OCCUPATIONAL THERAPY EVALUATION - INPATIENT    Room Number: 7605/7605-A  Evaluation Date: 3/25/2024     Type of Evaluation: Initial  Presenting Problem: anemia, migraine    Physician Order: IP Consult to Occupational Therapy  Reason for Therapy:  ADL/IADL Dysfunction and Discharge Planning      OCCUPATIONAL THERAPY ASSESSMENT   Patient is a 43 year old female admitted on 3/24/2024 with Presenting Problem: anemia, migraine. Co-Morbidities : Chiari malformation c posterior decompression surgery, migraines, Anemia  Patient is currently functioning at baseline with toileting, upper body dressing, lower body dressing, grooming, bed mobility, transfers, stating sitting balance, dynamic sitting balance, static standing balance, and dynamic standing balance.  Prior to admission, patient's baseline is Mod I.  Patient met all OT goals at Mod I level.  Patient reports no further questions/concerns at this time.         WEIGHT BEARING RESTRICTION  Weight Bearing Restriction: None                Recommendations for nursing staff:   Transfers: Mod I  Toileting location: Toilet    EVALUATION SESSION:  Patient at start of session: supine in bed for session  FUNCTIONAL TRANSFER ASSESSMENT  Sit to Stand: Edge of Bed  Edge of Bed: Modified Independent  Toilet Transfer: Modified Independent    BED MOBILITY  Rolling: Modified Independent  Supine to Sit : Modified Independent  Sit to Supine (OT): Modified Independent    BALANCE ASSESSMENT  Static Sitting: Modified Independent  Sitting Bilateral: Modified Independent  Static Standing: Modified Independent  Standing Bilateral: Modified Independent    FUNCTIONAL ADL ASSESSMENT  Grooming Standing: Modified Independent (at sink)  LB Dressing Seated: Modified Independent (for underwear up and down in stadnding and socks in sitting)  Toileting Seated: Modified Independent (at std toilet)      ACTIVITY TOLERANCE: vitals stable                         O2 SATURATIONS       COGNITION  Overall  Cognitive Status:  WFL - within functional limits  COGNITION ASSESSMENTS       Upper Extremity:   ROM: within functional limits   Strength: is within functional limits   Coordination:  Gross motor: WNl  Fine motor: WNL  Sensation: Light touch:  intact    EDUCATION PROVIDED  Patient: Role of Occupational Therapy; Plan of Care  Patient's Response to Education: Verbalized Understanding; Returned Demonstration    Equipment used: RW  Demonstrates functional use    Therapist comments: Pt reported fatigue at home, pt educated on work simplification and energy conservation for at home to assist with independence with fatigue at home.    Patient End of Session: In bed;Needs met;Call light within reach;All patient questions and concerns addressed;SCDs in place    OCCUPATIONAL PROFILE    HOME SITUATION  Type of Home: House  Home Layout: Multi-level  Lives With: Daughter;Son (16 year old twins)    Toilet and Equipment: Standard height toilet  Shower/Tub and Equipment: Walk-in shower  Other Equipment: None    Occupation/Status: retired  Hand Dominance: Right  Drives: Yes  Patient Regularly Uses: None    Prior Level of Function: Pt typically independent with ADLs and mobility. Pt does not use AD.    SUBJECTIVE  Pt stated, \"I am doing better.\"    PAIN ASSESSMENT  Ratin  Location: no pain at this time       OBJECTIVE  Precautions: None  Fall Risk: Standard fall risk    WEIGHT BEARING RESTRICTION  Weight Bearing Restriction: None                AM-PAC ‘6-Clicks’ Inpatient Daily Activity Short Form  -   Putting on and taking off regular lower body clothing?: None  -   Bathing (including washing, rinsing, drying)?: None  -   Toileting, which includes using toilet, bedpan or urinal? : None  -   Putting on and taking off regular upper body clothing?: None  -   Taking care of personal grooming such as brushing teeth?: None  -   Eating meals?: None    AM-PAC Score:  Score: 24  Approx Degree of Impairment: 0%  Standardized Score  (AM-PAC Scale): 57.54      ADDITIONAL TESTS     NEUROLOGICAL FINDINGS        PLAN   Patient has been evaluated and presents with no skilled Occupational Therapy needs at this time.  Patient discharged from Occupational Therapy services.  Please re-order if a new functional limitation presents during this admission.      Patient Evaluation Complexity Level:   Occupational Profile/Medical History LOW - Brief history including review of medical or therapy records    Specific performance deficits impacting engagement in ADL/IADL LOW  1 - 3 performance deficits    Client Assessment/Performance Deficits LOW - No comorbidities nor modifications of tasks    Clinical Decision Making LOW - Analysis of occupational profile, problem-focused assessments, limited treatment options    Overall Complexity LOW     OT Session Time: 15 minutes  Self-Care Home Management: 10 minutes  Therapeutic Activity: 0 minutes  Neuromuscular Re-education: 0 minutes  Therapeutic Exercise: 0 minutes  Cognitive Skills: 0 minutes  Sensory Integrative: 0 minutes  Orthotic Management and Trainin minutes  Can add/delete any of these

## 2024-03-25 NOTE — PAYOR COMM NOTE
--------------  ADMISSION REVIEW     Payor: [employer]  Subscriber #:  [id number]  Authorization Number: [id number]    Admit date: 3/24/24  Admit time:  9:15 PM       REVIEW DOCUMENTATION:     ED Provider Notes              Patient Seen in: [facility]      History     Chief Complaint   Patient presents with    Back Pain    Headache     Stated Complaint: headache and bacck pain    Subjective:   HPI    The patient is a 43-year-old female with a history of Chiari malformation, migraines peripheral neuropathy presented to the emergency room with reports of migraine for the last 3 days.  Symptoms were not maximal at onset.  She states the headache is diffusely on top of her head.  She has had no changes in her vision but does note photophobia.  She has had no nausea or vomiting and denies any neck stiffness or fevers.  Patient also states that she started having back pain before the onset of her migraine.  She states she feels like she is being ripped apart.  She notes that she has tingling down her legs but tells me she has peripheral neuropathy.  Patient tells me she had 1 episode of incontinence which is very unusual for her.  As it relates her headaches she is try Topamax which she usually takes but that has not provided her with any relief.  She also tried Sudafed thinking that she had a sinus issue but that also did not help.    In the past patient had to get fluid drained secondary to her Chiari malformation.  She states her headache does not feel like she needs fluid drained.  When that typically happens she is unable to move her neck and she has pressure in the back of her head which is not consistent with her symptoms this time around.    Objective:   Past Medical History:   Diagnosis Date    Allergic rhinitis     Anemia     B12 deficiency     Chiari malformation     Iron deficiency     Migraines     Peripheral neuropathy     Vitamin D deficiency      Review of Systems    Positive  for stated complaint: headache and bacck pain  Other systems are as noted in HPI.  Constitutional and vital signs reviewed.      All other systems reviewed and negative except as noted above.    Physical Exam     ED Triage Vitals [03/24/24 1454]   /70   Pulse 85   Resp 18   Temp 97.2 °F (36.2 °C)   Temp src Temporal   SpO2 97 %   O2 Device None (Room air)       Current:/63   Pulse 60   Temp 97.8 °F (36.6 °C) (Oral)   Resp 17   Wt 108 kg   SpO2 100%   BMI 36.19 kg/m²         Physical Exam  Vitals and nursing note reviewed.   Constitutional:       Comments: She is lying in the stretcher with her eyes closed.   HENT:      Head: Normocephalic and atraumatic.   Eyes:      Extraocular Movements: Extraocular movements intact.      Pupils: Pupils are equal, round, and reactive to light.      Comments: Conjunctiva is pale   Cardiovascular:      Rate and Rhythm: Normal rate and regular rhythm.      Pulses: Normal pulses.      Heart sounds: Normal heart sounds.   Pulmonary:      Effort: Pulmonary effort is normal. No respiratory distress.      Breath sounds: Normal breath sounds. No wheezing.   Abdominal:      General: Abdomen is flat. There is no distension.      Tenderness: There is no abdominal tenderness.   Musculoskeletal:         General: Normal range of motion.      Comments: There is tenderness to palpation along the lumbar spine   Skin:     General: Skin is warm.   Neurological:      General: No focal deficit present.      Mental Status: She is oriented to person, place, and time.   Psychiatric:         Mood and Affect: Mood normal.         Behavior: Behavior normal.               ED Course     Labs Reviewed   COMP METABOLIC PANEL (14) - Abnormal; Notable for the following components:       Result Value    Chloride 115 (*)     BUN 8 (*)     All other components within normal limits   RBC MORPHOLOGY SCAN - Abnormal; Notable for the following components:    RBC Morphology See morphology below (*)      Hypochromia 2+ (*)     All other components within normal limits   CBC W/ DIFFERENTIAL - Abnormal; Notable for the following components:    RBC 3.16 (*)     HGB 5.3 (*)     HCT 19.8 (*)     .0 (*)     MCV 62.7 (*)     MCH 16.8 (*)     MCHC 26.8 (*)     All other components within normal limits   PROTHROMBIN TIME (PT) - Normal   POCT PREGNANCY URINE - Normal   CBC WITH DIFFERENTIAL WITH PLATELET    Narrative:     The following orders were created for panel order CBC With Differential With Platelet.  Procedure                               Abnormality         Status                     ---------                               -----------         ------                     CBC W/ DIFFERENTIAL[[id number]]          Abnormal            Final result                 Please view results for these tests on the individual orders.   PATH COMMENT CBC   TYPE AND SCREEN    Narrative:     The following orders were created for panel order Type and screen.  Procedure                               Abnormality         Status                     ---------                               -----------         ------                     ABORH (Blood Type)[[id number]]                               Final result               Antibody Screen[[id number]]                                  Final result                 Please view results for these tests on the individual orders.   ABORH (BLOOD TYPE)   ANTIBODY SCREEN   PREPARE RBC   RAINBOW DRAW LAVENDER   RAINBOW DRAW LIGHT GREEN   RAINBOW DRAW BLUE     MRI SPINE LUMBAR (CPT=72148)    Result Date: 3/24/2024  CONCLUSION:  1. 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  This has progressed significantly since prior cervical spine MRI and could be the cause of neurologic symptoms.  This patient has had a prior posterior decompression surgery for Chiari malformation  and correlation with known surgical and clinical history is necessary.  If indicated additional evaluation of cervical spine should be considered. 2. There is multilevel degenerative disc disease with multiple disc protrusions at levels in lumbar spine described above. 3. There is significant degenerative disc disease at the T10-T11 level with moderate central canal stenosis at that level.    LOCATION:  [facility]   Dictated by (CST): Shane Ramirez MD on 3/24/2024 at 7:33 PM     Finalized by (CST): Shane Ramirez MD on 3/24/2024 at 7:39 PM           MDM      The differential includes the following  Migraine headache, sinus infection, tension headache,  As relates to the patient's back pain differential includes lumbar strain, cord compression which is a life threat    Pertinent comorbidities include  As detailed above in the HPI    Pertinent social history includes    ER course  The patient is afebrile and hemodynamically stable as well as in no acute distress.  She does not have any focal deficits on exam.  She does appear uncomfortable.  I believe her headache is likely consistent with migraines but I am concerned about her back pain and reports of bladder incontinence.  I will be obtaining a MRI of the lumbar spine.  Patient will be given a migraine cocktail consisting of Reglan, Benadryl, Decadron, Depakote and magnesium.  She will also be given IV fluids.  Patient is unable to receive NSAIDs because of history of gastric bypass.  Now on exam patient also has very pale conjunctiva.  She denies any history of black stools or bleeding or heavy menses.  She states she has needed a blood transfusion in the past.    Ultimately patient underwent an MRI of her lumbar spine which showed progressive syringohydromyelia.  This was discussed with neurosurgery Dr. Russo.  Who is recommending MRI of brain and C-spine.     2 units of blood has been ordered.  Patient has started to receive the first unit of blood.  I explained her  need for admission.  She complained of worsening back pain therefore she was given 4 mg of morphine..    Labs  Hemoglobin is 5.5.  Hemoglobin from last October was 8.    Imaging studies  I reviewed the images and agree with the radiologist report that showed the following there is syringohydromyelia    External data reviewed  Prior results of MRI from 2012 and 2013.    Discussion of management with external providers  Case discussed with neurosurgery as well as the [facility]ul hospitalist.  Admission disposition: 3/24/2024 10:47 PM         A total of 35 minutes of critical care time (exclusive of billable procedures) was administered to manage the patient's critical lab values and critical imaging findings due to her anemia requiring blood transfusion and syrinx on MRI requiring additional studies..  This involved direct patient intervention, complex decision making, and/or extensive discussions with the patient, family, and clinical staff.       Medical Decision Making      Disposition and Plan     Clinical Impression:  1. Iron deficiency anemia, unspecified iron deficiency anemia type    2. Syringohydromyelia (HCC)         Disposition:  Admit  3/24/2024 10:47 pm    Hospital Problems       Present on Admission  Date Reviewed: 9/21/2021            ICD-10-CM Noted POA    Anemia D64.9 11/18/2013 Unknown             Signed by Susanna Maya MD on 3/24/2024 10:47 PM       [facility] History and Physical           Chief Complaint   Patient presents with    Back Pain    Headache         PCP: Yenifer Major MD        History of Present Illness: Patient is a 43 year old female with PMH sig for Chiari malformation, migraines, peripheral neuropathy, anemia p/w headache and low back pain. Started about a week ago and also associated with photophobia. Pain radiates down both legs and feels like an electrical shock. Denies visual changes/deficits. Denies N/V. No fevers or neck stiffness. Also reporting urinary incontinence  episodes for more than a month. Tried taking topamax with no relief.   In the ED her VSS. Labs with Hgb of 5.3 with microcytosis, platelets of 973. 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 NSG consulted, ordered blood transferusions and admitted for further evaluation and treatment.      Assessment/Plan:      43 year old female with PMH sig for Chiari malformation, migraines, peripheral neuropathy, anemia p/w headache and low back pain.      Cervical/thoracic syryingohydromyelia with back pain and ?urinary incontinence   - Hx Chiari malformation   - await further NSG recommendations  - repeat Cervical MRI  - prn analgesics     Acute microcytic anemia  Thrombocytosis  - no evidence of hemorrhage   - s/p 2u PRBC in ER  - monitor  - will consult hematology      Migraines  Peripheral neuropathy  - gabapentin TID     FEN: regular diet, PT/OT  Proph: SCDs  Code status: Full code      Outpatient records or previous hospital records reviewed.   [facility] hospitalist to continue to follow patient while in house        Magdalena Ruvalcaba MD  [facility]  Hospitalist        Consult  ASSESSMENT & PLAN      ASSESSMENT:  Cervicothoracic syringohydromyelia   Low back pain with BLE radiculopathy  Anemia      PLAN:  No acute surgical intervention is indicated at this time  Obtain MRI cervical + thoracic spine W+WO   Obtain MRI brain W+WO  Further recommendations to follow imaging   Medical management per hospitalist     Plan was discussed with Dr. Devries. Thank you very much for the consult.     Erica Webb, APRN-NP  [facility]          MEDICATIONS ADMINISTERED IN LAST 1 DAY:  Transfuse RBC       Date Action Dose Route User    3/24/2024 1935 New Bag (none) Intravenous Chris Busch, RN          Transfuse RBC       Date Action Dose Route User    3/24/2024 2246 New Bag (none) Intravenous  Gage Torres RN          dexAMETHasone PF (Decadron) 10 MG/ML injection 10 mg       Date Action Dose Route User    3/24/2024 1724 Given 10 mg Intravenous Dorota Fox RN          diphenhydrAMINE (Benadryl) 50 mg/mL  injection 25 mg       Date Action Dose Route User    3/24/2024 1724 Given 25 mg Intravenous Dorota Fox RN          HYDROcodone-acetaminophen (Norco) 5-325 MG per tab 2 tablet       Date Action Dose Route User    3/24/2024 2144 Given 2 tablet Oral Gage Torres RN          lactated ringers infusion       Date Action Dose Route User    3/24/2024 2130 New Bag (none) Intravenous Gage Torres RN          magnesium sulfate in dextrose 5% 1 g/100mL infusion premix 1 g       Date Action Dose Route User    3/24/2024 1725 New Bag 1 g Intravenous Dorota Fox RN          metoclopramide (Reglan) 5 mg/mL injection 10 mg       Date Action Dose Route User    3/24/2024 1724 Given 10 mg Intravenous Dorota Fox RN          morphINE PF 4 MG/ML injection 4 mg       Date Action Dose Route User    3/25/2024 0908 Given 4 mg Intravenous Isamar Aldrich RN    3/25/2024 0523 Given 4 mg Intravenous Gage Torres RN    3/25/2024 0104 Given 4 mg Intravenous Gage Torres RN          morphINE PF 4 MG/ML injection 4 mg       Date Action Dose Route User    3/24/2024 1932 Given 4 mg Intravenous Chris Busch RN          topiramate (TopaMAX) tab 50 mg       Date Action Dose Route User    3/25/2024 0846 Given 50 mg Oral Margarita Heaton RN    3/24/2024 2144 Given 50 mg Oral Gage Torres RN          valproate (Depacon) 500 mg in sodium chloride 0.9% 50 mL IVPB       Date Action Dose Route User    3/24/2024 1725 New Bag 500 mg Intravenous Dorota Fox RN          gabapentin (Neurontin) cap 1,000 mg       Date Action Dose Route User    3/25/2024 0846 Given 1,000 mg Oral Margarita Heaton RN    3/24/2024 2144 Given 1,000 mg Oral Gage Torres, RN            Vitals (last day)        Date/Time Temp Pulse Resp BP SpO2 Weight O2 Device O2 Flow Rate (L/min) [facility]    03/25/24 0915 98.2 °F (36.8 °C) 88 21 136/69 100 % -- None (Room air) -- BT    03/25/24 0530 -- 70 16 126/67 100 % -- None (Room air) --     03/25/24 0530 98.2 °F (36.8 °C) -- -- -- -- -- -- --     03/25/24 0145 98.2 °F (36.8 °C) 66 16 130/61 100 % -- -- --     03/24/24 2300 98.1 °F (36.7 °C) 89 23 132/69 100 % -- None (Room air) --     03/24/24 2246 -- 94 23 127/63 100 % -- -- --     03/24/24 2245 97.8 °F (36.6 °C) -- -- 127/63 -- -- -- --     03/24/24 2123 -- -- -- -- -- 238 lb -- --     03/24/24 2120 97.9 °F (36.6 °C) 60 17 135/64 100 % 238 lb 12.8 oz None (Room air) -- AN    03/24/24 1946 96.8 °F (36 °C) 72 20 147/68 100 % -- None (Room air) --     03/24/24 1934 97.4 °F (36.3 °C) 88 18 121/55 100 % -- None (Room air) -- DH    03/24/24 1815 -- 63 -- 148/63 100 % -- -- -- EF    03/24/24 1715 -- 66 -- 141/72 100 % -- -- -- EF    03/24/24 1454 97.2 °F (36.2 °C) 85 18 117/70 97 % 200 lb None (Room air) -- SV          CIWA Scores (since admission)       None          Blood Transfusion Record       Product Unit Status Volume Start End            Transfuse RBC     0377  24  [id number]  [id number] Stopped  03/24/24 2246 03/25/24 0330     03  24  [id number]  [id number] Stopped 282 mL 03/24/24 1935 03/24/24 2156

## 2024-03-25 NOTE — ED QUICK NOTES
Orders for admission, patient is aware of plan and ready to go upstairs. Any questions, please call ED RN michael at extension [phone].     Patient Covid vaccination status: Fully vaccinated     COVID Test Ordered in ED: None    COVID Suspicion at Admission: N/A    Running Infusions:  blood @ 200mL/hr    Mental Status/LOC at time of transport: A&Ox4    Other pertinent information: given morphine for pain, needs second unit of blood  CIWA score: N/A   NIH score:  N/A

## 2024-03-25 NOTE — PHYSICAL THERAPY NOTE
PHYSICAL THERAPY EVALUATION - INPATIENT     Room Number: 7605/7605-A  Evaluation Date: 3/25/2024  Type of Evaluation: Initial  Physician Order: PT Eval and Treat    Presenting Problem: Anemia  Co-Morbidities : Chiari malformation c posterior decompression surgery, migraines, Anemia  Reason for Therapy: Mobility Dysfunction and Discharge Planning    PHYSICAL THERAPY ASSESSMENT   Patient is currently functioning near baseline with bed mobility, transfers, and gait.  Prior to admission, patient's baseline is IND with all ADLs and mobility.  Patient is requiring supervision as a result of the following impairments: pain.  Physical Therapy will continue to follow for duration of hospitalization.    Patient will benefit from continued skilled PT Services For duration of hospitalization, however, given the patient is functioning near baseline level do not anticipate skilled therapy needs at discharge .    PLAN  PT Treatment Plan: Gait training;Stair training  Rehab Potential : Good  Frequency (Obs): 3-5x/week  Number of Visits to Meet Established Goals: 1      CURRENT GOALS    Goal #1 Patient is able to ambulate 200 feet with assist device: none at assistance level: independent   Goal #2 Pt will perform stair training 7 steps with bilateral railings with CGA     Goal #3      Goal #4    Goal #5    Goal #6    Goal Comments: Goals established on 3/25/2024      PHYSICAL THERAPY MEDICAL/SOCIAL HISTORY  History related to current admission: Patient is a 43 year old female admitted on 3/24/2024 from Home for Headache and LBP.  Pt diagnosed with Anemia.      HOME SITUATION  Type of Home: House   Home Layout: Multi-level  Stairs to Enter : 7  Railing: Yes  Stairs to Bedroom: 7  Railing: Yes    Lives With: Daughter;Son (16 year old twins)  Drives: Yes  Patient Owned Equipment: None  Patient Regularly Uses: None    Prior Level of [place]: Pt reports that she is IND with all ADLs and mobility.    SUBJECTIVE  \"I still have  some headache and low back pain\"      OBJECTIVE  Precautions: None  Fall Risk: Standard fall risk    WEIGHT BEARING RESTRICTION  Weight Bearing Restriction: None                PAIN ASSESSMENT  Rating: Unable to rate  Location: Low back  Management Techniques: Activity promotion;Body mechanics;Repositioning    COGNITION  Overall Cognitive Status:  WFL - within functional limits    RANGE OF MOTION AND STRENGTH ASSESSMENT  Upper extremity ROM and strength are within functional limits     Lower extremity ROM is within functional limits     Lower extremity strength is within functional limits       BALANCE  Static Sitting: Normal  Dynamic Sitting: Normal  Static Standing: Good  Dynamic Standing: Good    ADDITIONAL TESTS                                    ACTIVITY TOLERANCE                         O2 WALK       NEUROLOGICAL FINDINGS  Neurological Findings: Sensation           Sensation: Nomal         AM-PAC '6-Clicks' INPATIENT SHORT FORM - BASIC MOBILITY  How much difficulty does the patient currently have...  Patient Difficulty: Turning over in bed (including adjusting bedclothes, sheets and blankets)?: None   Patient Difficulty: Sitting down on and standing up from a chair with arms (e.g., wheelchair, bedside commode, etc.): A Little   Patient Difficulty: Moving from lying on back to sitting on the side of the bed?: A Little   How much help from another person does the patient currently need...   Help from Another: Moving to and from a bed to a chair (including a wheelchair)?: A Little   Help from Another: Need to walk in hospital room?: A Little   Help from Another: Climbing 3-5 steps with a railing?: A Little       AM-PAC Score:  Raw Score: 19   Approx Degree of Impairment: 41.77%   Standardized Score (AM-PAC Scale): 45.44   CMS Modifier (G-Code): CK    FUNCTIONAL ABILITY STATUS  Gait Assessment   Functional Mobility/Gait Assessment  Gait Assistance: Supervision  Distance (ft): 10  Assistive Device: Rolling  walker  Pattern: Within Functional Limits (Decrease Beatriz)    Skilled Therapy Provided     Bed Mobility:  Rolling: Mod I  Supine to sit: Mod I   Sit to supine: Mod I     Transfer Mobility:  Sit to stand: Sup   Stand to sit: Sup  Gait = Sup 10ft x 2 using no assisted device and no RW    Therapist's Comments: Pt improved gait with the use of the RW to assist in providing increase stabilization to reduce pain that limits pt's gait ability. Pt can benefit with one more PT session to identify if pt requires an assisted device and attempt stairs.       Exercise/Education Provided:  Bed mobility  Body mechanics  Gait training  Transfer training    Patient End of Session: In bed;Needs met;All patient questions and concerns addressed;RN aware of session/findings;Call light within reach;Family present      Patient Evaluation Complexity Level:  History Low - no personal factors and/or co-morbidities   Examination of body systems Low - addressing 1-2 elements   Clinical Presentation Low - Stable   Clinical Decision Making Low - Stable       PT Session Time: 23 minutes  Therapeutic Activity: 15 minutes

## 2024-03-25 NOTE — PLAN OF CARE
NURSING ADMISSION NOTE    Patient admitted via Cart  Oriented to room.  Safety precautions initiated.  Bed in low position.  Call light in reach.    Assumed care at approximately 2100.   A & Ox 4.   RA.   NSR on tele.   C/o moderate to severe headache and back pain. Pain managed with PRN Norco, PRN morphine and heat packs.   1 unit PRBCs infused.   Neurosx to see.  PT/OT to see.   Call light within reach.   Patient updated on plan of care.

## 2024-03-25 NOTE — ED QUICK NOTES
Resumed care of patient, report received from Dorota DUKE. Pt out of the department for MRI, blood ready.

## 2024-03-26 ENCOUNTER — APPOINTMENT (OUTPATIENT)
Dept: MRI IMAGING | Facility: HOSPITAL | Age: 44
End: 2024-03-26

## 2024-03-26 LAB
ANION GAP SERPL CALC-SCNC: 6 MMOL/L (ref 0–18)
BASOPHILS # BLD AUTO: 0.03 X10(3) UL (ref 0–0.2)
BASOPHILS NFR BLD AUTO: 0.4 %
BLOOD TYPE BARCODE: 7300
BUN BLD-MCNC: 7 MG/DL (ref 9–23)
CALCIUM BLD-MCNC: 8.5 MG/DL (ref 8.5–10.1)
CHLORIDE SERPL-SCNC: 115 MMOL/L (ref 98–112)
CO2 SERPL-SCNC: 21 MMOL/L (ref 21–32)
CREAT BLD-MCNC: 0.7 MG/DL
DEPRECATED HBV CORE AB SER IA-ACNC: 2 NG/ML
DIRECT COOMBS POLY: NEGATIVE
EGFRCR SERPLBLD CKD-EPI 2021: 111 ML/MIN/1.73M2 (ref 60–?)
EOSINOPHIL # BLD AUTO: 0.25 X10(3) UL (ref 0–0.7)
EOSINOPHIL NFR BLD AUTO: 3.2 %
ERYTHROCYTE [DISTWIDTH] IN BLOOD BY AUTOMATED COUNT: 31.8 %
GLUCOSE BLD-MCNC: 102 MG/DL (ref 70–99)
HCT VFR BLD AUTO: 23 %
HGB BLD-MCNC: 6.7 G/DL
HGB BLD-MCNC: 7.5 G/DL
HGB RETIC QN AUTO: 15 PG (ref 28.2–36.6)
IMM GRANULOCYTES # BLD AUTO: 0.01 X10(3) UL (ref 0–1)
IMM GRANULOCYTES NFR BLD: 0.1 %
IMM RETICS NFR: 0.06 RATIO (ref 0.1–0.3)
IRON SATN MFR SERPL: 3 %
IRON SERPL-MCNC: 13 UG/DL
LYMPHOCYTES # BLD AUTO: 3.32 X10(3) UL (ref 1–4)
LYMPHOCYTES NFR BLD AUTO: 41.9 %
MCH RBC QN AUTO: 19.7 PG (ref 26–34)
MCHC RBC AUTO-ENTMCNC: 29.1 G/DL (ref 31–37)
MCV RBC AUTO: 67.6 FL
MONOCYTES # BLD AUTO: 0.63 X10(3) UL (ref 0.1–1)
MONOCYTES NFR BLD AUTO: 8 %
NEUTROPHILS # BLD AUTO: 3.68 X10 (3) UL (ref 1.5–7.7)
NEUTROPHILS # BLD AUTO: 3.68 X10(3) UL (ref 1.5–7.7)
NEUTROPHILS NFR BLD AUTO: 46.4 %
OSMOLALITY SERPL CALC.SUM OF ELEC: 292 MOSM/KG (ref 275–295)
PLATELET # BLD AUTO: 720 10(3)UL (ref 150–450)
POTASSIUM SERPL-SCNC: 4.3 MMOL/L (ref 3.5–5.1)
RBC # BLD AUTO: 3.4 X10(6)UL
RETICS # AUTO: 12.2 X10(3) UL (ref 22.5–147.5)
RETICS/RBC NFR AUTO: 0.3 %
SODIUM SERPL-SCNC: 142 MMOL/L (ref 136–145)
TIBC SERPL-MCNC: 454 UG/DL (ref 240–450)
TRANSFERRIN SERPL-MCNC: 305 MG/DL (ref 200–360)
UNIT VOLUME: 350 ML
WBC # BLD AUTO: 7.9 X10(3) UL (ref 4–11)

## 2024-03-26 PROCEDURE — 70553 MRI BRAIN STEM W/O & W/DYE: CPT

## 2024-03-26 PROCEDURE — 72156 MRI NECK SPINE W/O & W/DYE: CPT

## 2024-03-26 PROCEDURE — 72157 MRI CHEST SPINE W/O & W/DYE: CPT

## 2024-03-26 RX ORDER — SIMETHICONE 80 MG
160 TABLET,CHEWABLE ORAL 4 TIMES DAILY PRN
Status: DISCONTINUED | OUTPATIENT
Start: 2024-03-26 | End: 2024-03-27

## 2024-03-26 RX ORDER — GADOTERATE MEGLUMINE 376.9 MG/ML
20 INJECTION INTRAVENOUS
Status: COMPLETED | OUTPATIENT
Start: 2024-03-26 | End: 2024-03-26

## 2024-03-26 RX ORDER — SODIUM CHLORIDE 9 MG/ML
INJECTION, SOLUTION INTRAVENOUS ONCE
Status: COMPLETED | OUTPATIENT
Start: 2024-03-26 | End: 2024-03-26

## 2024-03-26 NOTE — CONSULTS
[facility] HEMATOLOGY ONCOLOGY  Report of Consultation    Anna Galinod Patient Status:  Inpatient    1980 MRN [id number]   Location [facility] 7NE-A Attending Haley Ruvalcaba*   Hosp Day # 2 PCP Yenifer Major MD     ADMIT DATE AND TIME: 3/24/2024  3:50 PM    ADMIT DIAGNOSIS: Anemia          REASON FOR CONSULTATION:     Anemia      HISTORY OF PRESENTING ILLNESS     Anna Galindo is a 43 year old female with history of Chiari malformation and migraines presented with photophobia and headaches.  Also had pain radiating down her legs like electric shock.  She was evaluated in ER and was noted to have a hemoglobin of 5.3 with elevated platelet count of 973.  Blood transfusions were given overnight.    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  Neurosurgery consulted              PERTINENT HISTORY:     History:  Past Medical History:   Diagnosis Date    Allergic rhinitis     Anemia     B12 deficiency     Chiari malformation     Iron deficiency     Migraines     Peripheral neuropathy     Vitamin D deficiency      Past Surgical History:   Procedure Laterality Date    BRAIN SURGERY      chiari malformation     DELIVERY ONLY      two c/s    GASTRIC BYPASS,OBESITY,SB RECONSTRUC      IR ANGIOPLASTY      15, done at [facility]    REMOVAL OF TONSILS,12+ Y/O       Family History   Problem Relation Age of Onset    Diabetes Father     Diabetes Mother     Hypertension Mother     Other (Other) Mother         ARTHRISIS, OSTEOPOROSIS    Other (Other) Sister         LUPUS    Other (Other) Sister         MS    Other (Other) Sister         CHIARI MALFORMATION       SOCIAL HX   reports that she has never smoked. She has never used smokeless tobacco. She  reports that she does not drink alcohol and does not use drugs.    Allergies:  Allergies   Allergen Reactions    Motrin [Ibuprofen]      Avoids due to hx of gastric bypass    Tramadol OTHER (SEE COMMENTS)     headache       Medications:    Pre-Admission Meds:  Current Outpatient Medications   Medication Instructions    ergocalciferol (VITAMIN D2) 50,000 Units, Oral, Every 7 days    Ferrous Sulfate (IRON) 325 (65 Fe) MG Oral Tab 1 tablet, Oral, Daily    gabapentin (NEURONTIN) 400 MG Oral Cap Take 1 tablet 3 times daily, in addition to 600 mg tablet to equal 1000 mg TID.    gabapentin (NEURONTIN) 600 MG Oral Tab Take 1 tablet 3 times daily, in addition to 400 mg tablet to equal 1000 mg TID.    gabapentin (NEURONTIN) 1,200 mg, Oral, 3 times daily    HYDROcodone-acetaminophen (NORCO) 5-325 MG Oral Tab As needed    topiramate (TOPAMAX) 50 mg, Oral, 2 times daily    topiramate (TOPAMAX) 100 mg, Oral, 2 times daily       Current Inpatient Meds:   gabapentin  1,000 mg Oral TID    topiramate  50 mg Oral BID       Infusion   lactated ringers 100 mL/hr at 03/24/24 2130       PRN  butalbital-acetaminophen-caffeine, melatonin, polyethylene glycol (PEG 3350), sennosides, bisacodyl, fleet enema, ondansetron, prochlorperazine, acetaminophen **OR** HYDROcodone-acetaminophen **OR** HYDROcodone-acetaminophen, morphINE **OR** morphINE **OR** morphINE    Review of Systems:  A 12-point review of systems was done with pertinent positives and negatives per the HPI.       Physical Exam:   Blood pressure 134/67, pulse 67, temperature 98.7 °F (37.1 °C), temperature source Oral, resp. rate 24, height 5' 8\" (1.727 m), weight 238 lb (108 kg), SpO2 100%, not currently breastfeeding.    Performance Status: 1   General: Patient is alert and oriented x 3, not in acute distress.   HEENT: No Icterus. Oropharynx is clear. Dentition: Good   Neck:  No palpable lymphadenopathy. Neck is supple.   Chest: Clear to auscultation.   Heart: Regular rate and  rhythm.    Abdomen: Soft, non tender    Extremities: Pedal pulses are present. No edema.   Neurological: Grossly intact.    Lymphatics: There is no palpable lymphadenopathy           DIAGNOSTIC WORK UP:     Laboratory Data:      Recent Results (from the past 48 hour(s))   POCT Pregnancy, Urine    Collection Time: 03/24/24  4:22 PM   Result Value Ref Range    POCT Urine Pregnancy Negative Negative   RAINBOW DRAW LAVENDER    Collection Time: 03/24/24  4:29 PM   Result Value Ref Range    Hold Lavender Auto Resulted    RAINBOW DRAW LIGHT GREEN    Collection Time: 03/24/24  4:29 PM   Result Value Ref Range    Hold Lt Green Auto Resulted    RAINBOW DRAW BLUE    Collection Time: 03/24/24  4:29 PM   Result Value Ref Range    Hold Blue Auto Resulted    Comp Metabolic Panel (14)    Collection Time: 03/24/24  4:29 PM   Result Value Ref Range    Glucose 95 70 - 99 mg/dL    Sodium 143 136 - 145 mmol/L    Potassium 3.6 3.5 - 5.1 mmol/L    Chloride 115 (H) 98 - 112 mmol/L    CO2 22.0 21.0 - 32.0 mmol/L    Anion Gap 6 0 - 18 mmol/L    BUN 8 (L) 9 - 23 mg/dL    Creatinine 0.71 0.55 - 1.02 mg/dL    Calcium, Total 8.9 8.5 - 10.1 mg/dL    Calculated Osmolality 294 275 - 295 mOsm/kg    eGFR-Cr 109 >=60 mL/min/1.73m2    AST 20 15 - 37 U/L    ALT 15 13 - 56 U/L    Alkaline Phosphatase 92 37 - 98 U/L    Bilirubin, Total 0.5 0.1 - 2.0 mg/dL    Total Protein 7.4 6.4 - 8.2 g/dL    Albumin 3.9 3.4 - 5.0 g/dL    Globulin  3.5 2.8 - 4.4 g/dL    A/G Ratio 1.1 1.0 - 2.0   CBC W/ DIFFERENTIAL    Collection Time: 03/24/24  4:29 PM   Result Value Ref Range    WBC 6.3 4.0 - 11.0 x10(3) uL    RBC 3.16 (L) 3.80 - 5.30 x10(6)uL    HGB 5.3 (LL) 12.0 - 16.0 g/dL    HCT 19.8 (L) 35.0 - 48.0 %    .0 (H) 150.0 - 450.0 10(3)uL    Immature Platelet Fraction 0.7 0.0 - 7.0 %    MCV 62.7 (L) 80.0 - 100.0 fL    MCH 16.8 (L) 26.0 - 34.0 pg    MCHC 26.8 (L) 31.0 - 37.0 g/dL    RDW 30.6 %    Neutrophil Absolute Prelim 3.55 1.50 - 7.70 x10 (3) uL    Neutrophil  Absolute 3.55 1.50 - 7.70 x10(3) uL    Lymphocyte Absolute 1.90 1.00 - 4.00 x10(3) uL    Monocyte Absolute 0.62 0.10 - 1.00 x10(3) uL    Eosinophil Absolute 0.11 0.00 - 0.70 x10(3) uL    Basophil Absolute 0.06 0.00 - 0.20 x10(3) uL    Immature Granulocyte Absolute 0.04 0.00 - 1.00 x10(3) uL    Neutrophil % 56.4 %    Lymphocyte % 30.3 %    Monocyte % 9.9 %    Eosinophil % 1.8 %    Basophil % 1.0 %    Immature Granulocyte % 0.6 %   RBC Morphology Scan    Collection Time: 03/24/24  4:29 PM   Result Value Ref Range    RBC Morphology See morphology below (A) Normal, Slide reviewed, see previous RBC morphology.    Platelet Morphology Normal Normal    Hypochromia 2+ (A)      Microcytosis 1+      Macrocytosis 1+      Target Cells 1+     Prothrombin Time (PT)    Collection Time: 03/24/24  4:29 PM   Result Value Ref Range    PT 14.7 11.6 - 14.8 seconds    INR 1.14 0.80 - 1.20   ABORH (Blood Type)    Collection Time: 03/24/24  6:16 PM   Result Value Ref Range    ABO BLOOD TYPE B     RH BLOOD TYPE Positive    Antibody Screen    Collection Time: 03/24/24  6:16 PM   Result Value Ref Range    Antibody Screen Negative    Hemoglobin    Collection Time: 03/25/24  2:31 AM   Result Value Ref Range    HGB 7.1 (L) 12.0 - 16.0 g/dL   Comp Metabolic Panel (14)    Collection Time: 03/25/24  6:10 AM   Result Value Ref Range    Glucose 101 (H) 70 - 99 mg/dL    Sodium 144 136 - 145 mmol/L    Potassium 4.2 3.5 - 5.1 mmol/L    Chloride 114 (H) 98 - 112 mmol/L    CO2 23.0 21.0 - 32.0 mmol/L    Anion Gap 7 0 - 18 mmol/L    BUN 5 (L) 9 - 23 mg/dL    Creatinine 0.74 0.55 - 1.02 mg/dL    Calcium, Total 9.0 8.5 - 10.1 mg/dL    Calculated Osmolality 295 275 - 295 mOsm/kg    eGFR-Cr 104 >=60 mL/min/1.73m2    AST 9 (L) 15 - 37 U/L    ALT 11 (L) 13 - 56 U/L    Alkaline Phosphatase 92 37 - 98 U/L    Bilirubin, Total 0.7 0.1 - 2.0 mg/dL    Total Protein 7.1 6.4 - 8.2 g/dL    Albumin 3.6 3.4 - 5.0 g/dL    Globulin  3.5 2.8 - 4.4 g/dL    A/G Ratio 1.0 1.0 -  2.0   Magnesium    Collection Time: 03/25/24  6:10 AM   Result Value Ref Range    Magnesium 2.3 1.6 - 2.6 mg/dL   CBC W/ DIFFERENTIAL    Collection Time: 03/25/24  6:10 AM   Result Value Ref Range    WBC 7.9 4.0 - 11.0 x10(3) uL    RBC 3.69 (L) 3.80 - 5.30 x10(6)uL    HGB 7.2 (L) 12.0 - 16.0 g/dL    HCT 25.1 (L) 35.0 - 48.0 %    .0 (H) 150.0 - 450.0 10(3)uL    MCV 68.0 (L) 80.0 - 100.0 fL    MCH 19.5 (L) 26.0 - 34.0 pg    MCHC 28.7 (L) 31.0 - 37.0 g/dL    RDW 31.8 %    Neutrophil Absolute Prelim 5.58 1.50 - 7.70 x10 (3) uL    Neutrophil Absolute 5.58 1.50 - 7.70 x10(3) uL    Lymphocyte Absolute 1.57 1.00 - 4.00 x10(3) uL    Monocyte Absolute 0.74 0.10 - 1.00 x10(3) uL    Eosinophil Absolute 0.01 0.00 - 0.70 x10(3) uL    Basophil Absolute 0.01 0.00 - 0.20 x10(3) uL    Immature Granulocyte Absolute 0.02 0.00 - 1.00 x10(3) uL    Neutrophil % 70.4 %    Lymphocyte % 19.8 %    Monocyte % 9.3 %    Eosinophil % 0.1 %    Basophil % 0.1 %    Immature Granulocyte % 0.3 %   Reticulocyte Count    Collection Time: 03/25/24  6:10 AM   Result Value Ref Range    Retic% 0.3 (L) 0.5 - 2.5 %    Retic Absolute 12.2 (L) 22.5 - 147.5 x10(3) uL    Retic IRF 0.062 (L) 0.100 - 0.300 Ratio    Reticulocyte Hemoglobin Equivalent 15.0 (L) 28.2 - 36.6 pg   Iron And Tibc    Collection Time: 03/25/24  6:10 AM   Result Value Ref Range    Iron 13 (L) 50 - 170 ug/dL    Transferrin 305 200 - 360 mg/dL    Total Iron Binding Capacity 454 (H) 240 - 450 ug/dL    % Saturation 3 (L) 15 - 50 %   Ferritin    Collection Time: 03/25/24  6:10 AM   Result Value Ref Range    Ferritin 2.0 (L) 12.0 - 240.0 ng/mL   Direct Antiglobulin Test    Collection Time: 03/25/24  6:10 AM   Result Value Ref Range    Niranjan (POLY) Negative    Prepare RBC Once    Collection Time: 03/26/24 12:30 AM   Result Value Ref Range    Blood Product [id number]     Unit Number [id number]     UNIT ABO B     UNIT RH POS     Product Status Presumed Transfused     Expiration Date  [id number]     Blood Type Barcode 7300     Unit Volume 350 ml       Recent Labs   Lab 03/24/24  1629 03/25/24  0231 03/25/24  0610   RBC 3.16*  --  3.69*   HGB 5.3* 7.1* 7.2*   HCT 19.8*  --  25.1*   MCV 62.7*  --  68.0*   MCH 16.8*  --  19.5*   MCHC 26.8*  --  28.7*   RDW 30.6  --  31.8   NEPRELIM 3.55  --  5.58   WBC 6.3  --  7.9   .0*  --  957.0*      RBC Hemoglobin Hematocrit Platelet Count   Latest Ref Rng 3.80 - 5.30 x10(6)uL 12.0 - 16.0 g/dL 35.0 - 48.0 % 150.0 - 450.0 10(3)uL   4/26/2012 4.40  10.5 (L)  33.8 (L)  357.0    4/27/2012 4.10  9.6 (L)  31.5 (L)  322.0    9/12/2012 3.80  8.9 (L)  29.0 (L)  284.0    11/17/2013 3.61 (L)  8.5 (L)  27.6 (L)  272.0    6/25/2014 4.18  8.8 (L)  28.7 (L)  363.0    9/21/2021 3.40 (L)  7.1 (L)  24.8 (L)  710 (H)    3/24/2024 3.16 (L)  5.3 (LL)  19.8 (L)  973.0 (H)    3/25/2024 3.69 (L)  7.2 (L)  25.1 (L)  957.0 (H)      7.1 (L)        ontains abnormal data CBC (HEMOGRAM)    Component  Ref Range & Units 19 yr ago   WBC  4.0 - 10.0 K/UL 12.9 High    RBC  3.60 - 5.50 M/UL 4.20   HGB  12.0 - 16.0 GM/DL 11.6 Low    HCT  34.0 - 51.0 % 34.0   MCV  85 - 95 FL 81.0 Low    MCH  28.0 - 32.0 PG 27.5 Low    MCHC  32.0 - 36.0 GM/DL 34.0   RDW  11.0 - 15.0 % 12.7   PLT CNT  150 - 400 K/   Resulting Agency HEMATOLOGY   Specimen Collected: 04/04/04 03:30    Performed by: HEMATOLOGY Last Resulted: 04/04/04 03:54   Received From: [facility]  Result Received: 03/24/24 14:32      FERRITIN   Latest Ref Rng 13.0 - 150.0 ng/mL   9/21/2021 5.0 (L)        Contains abnormal data FERRITIN  Specimen: Blood  Component  Ref Range & Units 2 yr ago   Ferritin  13.0 - 150.0 ng/mL 5.0 Low    Resulting Agency [facility]   Specimen Collected: 09/21/21 13:56    Performed by: [facility] Last Resulted: 09/21/21 20:08   Received From: [facility]  Result Received: 03/24/24 14:32         CULTURE RESULTS  No results found for this visit on  03/24/24.      Imaging:    MRI SPINE LUMBAR (CPT=72148)    Result Date: 3/24/2024  CONCLUSION:  1. 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  This has progressed significantly since prior cervical spine MRI and could be the cause of neurologic symptoms.  This patient has had a prior posterior decompression surgery for Chiari malformation and correlation with known surgical and clinical history is necessary.  If indicated additional evaluation of cervical spine should be considered. 2. There is multilevel degenerative disc disease with multiple disc protrusions at levels in lumbar spine described above. 3. There is significant degenerative disc disease at the T10-T11 level with moderate central canal stenosis at that level.    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    Anemia  This is microcytic anemia  Reviewed her labs in the system  She always had low hemoglobin for the last 12 years  White count always been on the lower side and the platelets were normal in the past but for the last 3 years they have been elevated.  We checked iron labs and this showed significantly low ferritin and iron saturation  This is therefore consistent with severe iron deficiency  The likely cause of this is malabsorption due to previous gastric bypass surgery  I asked her specifically whether she had this seen hematology in the past and she had no recollection of seeing any hematologist.  However she did mention that she received IV iron in the past at other  [facility].    She is status post PRBC  We will start her on IV Venofer for now and will complete the infusions as an outpatient postdischarge    I explained to her that she will be requiring intermittent iron infusions because of malabsorption due to gastric bypass surgery      Syringohydromyelia  Per neurosurgery            Thank you for allowing me to participate in the care of your patient.    Mateus Chaves MD      This note was prepared using Dragon Medical voice recognition dictation software. As a result errors may occur. When identified these errors have been corrected. While every attempt is made to correct errors during dictation discrepancies may still exist. Please call me to clarify any errors.

## 2024-03-26 NOTE — PROGRESS NOTES
[facility]  Hospitalist Progress Note                                                                     [facility]   [facility]  7/21/1980    SUBJECTIVE:  Patient seen and examined.  Pain stable. Ambulating.  Mild headaches.  Waiting on MRI.  Denies CP/SOB.  NAD.       OBJECTIVE:  Temp:  [97.7 °F (36.5 °C)-98.7 °F (37.1 °C)] 97.8 °F (36.6 °C)  Pulse:  [67-84] 84  Resp:  [18-28] 19  BP: (116-135)/(57-73) 129/73  SpO2:  [99 %-100 %] 100 %  Exam  Gen: No acute distress, alert and oriented x3, no focal neurologic deficits  Pulm: Lungs clear bilaterally, normal respiratory effort  CV: Heart with regular rate and rhythm, no murmur.  Normal PMI.    Abd: Abdomen soft, nontender, nondistended, no organomegaly, bowel sounds present  MSK: Full range of motion in extremities, no clubbing, no cyanosis  Skin: no rashes or lesions    Labs:   Recent Labs   Lab 03/24/24  1629 03/25/24  0231 03/25/24  0610   WBC 6.3  --  7.9   HGB 5.3* 7.1* 7.2*   MCV 62.7*  --  68.0*   .0*  --  957.0*   INR 1.14  --   --        Recent Labs   Lab 03/24/24  1629 03/25/24  0610    144   K 3.6 4.2   * 114*   CO2 22.0 23.0   BUN 8* 5*   CREATSERUM 0.71 0.74   CA 8.9 9.0   MG  --  2.3   GLU 95 101*       Recent Labs   Lab 03/24/24  1629 03/25/24  0610   ALT 15 11*   AST 20 9*   ALB 3.9 3.6       No results for input(s): \"PGLU\" in the last 168 hours.    Meds:   Scheduled:    iron sucrose  200 mg Intravenous Once    gabapentin  1,000 mg Oral TID    topiramate  50 mg Oral BID     Continuous Infusions:    lactated ringers 100 mL/hr at 03/24/24 2130     PRN: simethicone, butalbital-acetaminophen-caffeine, melatonin, polyethylene glycol (PEG 3350), sennosides, bisacodyl, fleet enema, ondansetron, prochlorperazine, acetaminophen **OR** HYDROcodone-acetaminophen **OR** HYDROcodone-acetaminophen, morphINE **OR** morphINE **OR**  morphINE    Assessment/Plan:  Active Problems:    Anemia    43 year old female with PMH sig for Chiari malformation, migraines, peripheral neuropathy, anemia p/w headache and low back pain.      Cervical/thoracic syryingohydromyelia with back pain and ?urinary incontinence   - Hx Chiari malformation   - await further NSG recommendations  - Cervical/thoracic spine MRI pending  - prn analgesics     Acute microcytic anemia  Thrombocytosis  - no evidence of hemorrhage   - s/p 2u PRBC in ER  - monitor  - hematology following, plan for IV Fe transfusions      Migraines  Peripheral neuropathy  - gabapentin TID     FEN: regular diet, PT/OT  Proph: SCDs  Code status: Full code       Magdalena Ruvalcaba MD  [facility]  Message over Review[employer]AP/[employer]  Pager: [phone]

## 2024-03-26 NOTE — PROGRESS NOTES
[facility]  Neurosurgery Progress Note    Anna Galindo Patient Status:  Inpatient    1980 MRN [id number]   Location [facility] 7NE-A Attending Haley Ruvalcaba*   Hosp Day # 2 PCP Yenifer Major MD     PRINCIPLE PROBLEM:   C6-T2 syringohydromyelia  Low back pain     SUBJECTIVE     Pt examined, sitting up eating breakfast. Reports dull frontal headache, tingling in hands and BLE. Low back pain currently controlled. No new neurological symptoms.     OBJECTIVE/PHYSICAL EXAM     VITALS:  /73 (BP Location: Left arm)   Pulse 75   Temp 97.8 °F (36.6 °C) (Oral)   Resp 19   Ht 68\"   Wt 238 lb (108 kg)   SpO2 99%   BMI 36.19 kg/m²     GENERAL: No acute distress, non-toxic appearing, mood appropriate    HEENT: Normocephalic, atraumatic    RESP:  Respirations non-labored, even    CV:  NSR on tele    NEURO: Alert and oriented x3.  Able to follow commands.  Sensation to light touch is intact bilaterally.  SIERRA x 4. Gait deferred.     LABS     No new labs to review     IMAGING     No new imaging to review    ASSESSMENT & PLAN     ASSESSMENT:  Cervicothoracic syringohydromyelia   Low back pain with BLE radiculopathy    PLAN:  No acute neurosurgical intervention is indicated at this time  MRI brain, cervical + thoracic spine pending   Further recommendations to follow  Medical management per hospitalist    Erica Webb, APRN-NP  [facility]  3/26/2024, 8:39 AM      A total of 10 minutes of visit time (exclusible of billable procedures) was administered.  >50 % of time spent counseling/coordinating care.  Is this a shared or split note between Advanced Practice Provider and Physician? Yes

## 2024-03-26 NOTE — PLAN OF CARE
Assumed patient care at 0730  C/o severe head and back pain; relief with PRN norco and morphine  MRI to be completed  IVF infusing per order  Average PO intake  Ambulating to bathroom with standby assist

## 2024-03-26 NOTE — PAYOR COMM NOTE
--------------  CONTINUED STAY REVIEW    Payor: [employer] [facility]  Subscriber #:  [id number]  Authorization Number: [id number]    Admit date: 3/24/24  Admit time:  9:15 PM    Admitting Physician: Haley Ruvalcaba MD  Attending Physician:  Haley Ruvalcaba*  Primary Care Physician: Yenifer Major MD    REVIEW DOCUMENTATION:    3-25-24     Neurosurgery Progress Note     C6-T2 syringohydromyelia  Low back pain      SUBJECTIVE      Pt examined, sitting up eating breakfast. Reports dull frontal headache, tingling in hands and BLE. Low back pain currently controlled.    HEENT: Normocephalic, atraumatic     RESP:  Respirations non-labored, even     CV:  NSR on tele     NEURO: Alert and oriented x3.  Able to follow commands.  Sensation to light touch is intact bilaterally.  SIERRA x 4. Gait deferred.     ASSESSMENT:  Cervicothoracic syringohydromyelia   Low back pain with BLE radiculopathy     PLAN:  No acute neurosurgical intervention is indicated at this time  MRI brain, cervical + thoracic spine pending   Further recommendations to follow              MEDICATIONS ADMINISTERED IN LAST 1 DAY:  HYDROcodone-acetaminophen (Norco) 5-325 MG per tab 2 tablet       Date Action Dose Route User    3/26/2024 1400 Given 2 tablet Oral Lola Suarez RN    3/26/2024 1006 Given 2 tablet Oral Lola Suarez RN    3/26/2024 0148 Given 2 tablet Oral Gage Torres RN    3/25/2024 1838 Given 2 tablet Oral Isamar Aldrich RN          iron sucrose (Venofer) 20 mg/mL injection 200 mg       Date Action Dose Route User    3/26/2024 1234 New Bag 200 mg Intravenous Lola Suarez RN          lactated ringers infusion       Date Action Dose Route User    3/26/2024 1354 New Bag (none) Intravenous Lola Suarez RN          morphINE PF 4 MG/ML injection 4 mg       Date Action Dose Route User    3/26/2024 1234 Given 4 mg Intravenous Lola Suarez RN    3/26/2024 0839 Given 4 mg Intravenous Lola Suarez RN    3/26/2024  0637 Given 4 mg Intravenous Gage Torres RN    3/26/2024 0353 Given 4 mg Intravenous Gage Torres RN    3/25/2024 2352 Given 4 mg Intravenous Gage Torres RN    3/25/2024 2132 Given 4 mg Intravenous Gage Torres RN    3/25/2024 1705 Given 4 mg Intravenous Isamar Aldrich RN          simethicone (Mylicon) chewable tab 160 mg       Date Action Dose Route User    3/26/2024 1003 Given 160 mg Oral Lola Suarez RN          topiramate (TopaMAX) tab 50 mg       Date Action Dose Route User    3/26/2024 1002 Given 50 mg Oral Lola Suarez RN    3/25/2024 2129 Given 50 mg Oral Gage Torres RN          gabapentin (Neurontin) cap 1,000 mg       Date Action Dose Route User    3/26/2024 1002 Given 1,000 mg Oral Lola Suarez RN    3/25/2024 2129 Given 1,000 mg Oral Gage Torres RN    3/25/2024 1705 Given 1,000 mg Oral Isamar Aldrich RN            Vitals (last day)       Date/Time Temp Pulse Resp BP SpO2 Weight O2 Device O2 Flow Rate (L/min) [facility]    03/26/24 1145 97.7 °F (36.5 °C) 68 18 131/76 100 % -- None (Room air) -- MB    03/26/24 0945 -- 84 -- -- 100 % -- -- -- MB    03/26/24 0800 97.8 °F (36.6 °C) 75 19 129/73 99 % -- None (Room air) -- MB    03/26/24 0600 98.7 °F (37.1 °C) 67 24 134/67 100 % -- None (Room air) -- VO    03/25/24 2353 98.1 °F (36.7 °C) 73 18 135/68 100 % -- None (Room air) -- HM    03/25/24 2000 97.8 °F (36.6 °C) 76 20 131/61 100 % -- None (Room air) -- VO    03/25/24 1600 98.3 °F (36.8 °C) 81 28 116/57 100 % -- None (Room air) -- SS    03/25/24 1130 97.7 °F (36.5 °C) 73 19 130/66 100 % -- None (Room air) -- BT    03/25/24 0915 98.2 °F (36.8 °C) 88 21 136/69 100 % -- None (Room air) -- BT    03/25/24 0530 -- 70 16 126/67 100 % -- None (Room air) -- AN    03/25/24 0530 98.2 °F (36.8 °C) -- -- -- -- -- -- -- HM    03/25/24 0145 98.2 °F (36.8 °C) 66 16 130/61 100 % -- -- --           CIWA Scores (since admission)       None          Blood Transfusion Record        Product Unit Status Volume Start End            Transfuse RBC       24  [id number]  [id number] Stopped  03/24/24 2246 03/25/24 0330       24  [id number]  [id number] Completed 03/25/24 1840 282 mL 03/24/24 1935 03/24/24 2245                Procedures:      Plan:

## 2024-03-26 NOTE — PLAN OF CARE
Assumed patient care 0730  Patient alert and oriented X4  On room air, VSS, NSR on tele  Patient c/o severe head and back pain 6-8/10, managed with PRN IV Morphine and Norco  Up with standby assist and walker, voiding, 1 BM this shift   Ambulating in hallway   IVF infusing per order  MRIs complete  PRBCs ordered for Hgb of 6.7  IV Iron administered per order   Patient updated on plan of care    Problem: MUSCULOSKELETAL - ADULT  Goal: Return mobility to safest level of function  Description: INTERVENTIONS:  - Assess patient stability and activity tolerance for standing, transferring and ambulating w/ or w/o assistive devices  - Assist with transfers and ambulation using safe patient handling equipment as needed  - Ensure adequate protection for wounds/incisions during mobilization  - Obtain PT/OT consults as needed  - Advance activity as appropriate  - Communicate ordered activity level and limitations with patient/family  Outcome: Progressing  Goal: Maintain proper alignment of affected body part  Description: INTERVENTIONS:  - Support and protect limb and body alignment per provider's orders  - Instruct and reinforce with patient and family use of appropriate assistive device and precautions (e.g. spinal or hip dislocation precautions)  Outcome: Progressing     Problem: GASTROINTESTINAL - ADULT  Goal: Minimal or absence of nausea and vomiting  Description: INTERVENTIONS:  - Maintain adequate hydration with IV or PO as ordered and tolerated  - Nasogastric tube to low intermittent suction as ordered  - Evaluate effectiveness of ordered antiemetic medications  - Provide nonpharmacologic comfort measures as appropriate  - Advance diet as tolerated, if ordered  - Obtain nutritional consult as needed  - Evaluate fluid balance  Outcome: Progressing

## 2024-03-26 NOTE — PLAN OF CARE
Assumed care at approximately 2100.   A & Ox 4.   RA.   NSR on tele.   C/o moderate to severe back pain. Pain managed with PRN Norco, PRN morphine and heat packs.   MRI to be completed.  Call light within reach.   Patient updated on plan of care

## 2024-03-27 VITALS
DIASTOLIC BLOOD PRESSURE: 69 MMHG | RESPIRATION RATE: 20 BRPM | BODY MASS INDEX: 36.07 KG/M2 | HEIGHT: 68 IN | HEART RATE: 80 BPM | WEIGHT: 238 LBS | OXYGEN SATURATION: 100 % | TEMPERATURE: 98 F | SYSTOLIC BLOOD PRESSURE: 134 MMHG

## 2024-03-27 PROBLEM — G95.0 SYRINX OF SPINAL CORD (HCC): Status: ACTIVE | Noted: 2024-03-27

## 2024-03-27 LAB
ANION GAP SERPL CALC-SCNC: 6 MMOL/L (ref 0–18)
BASOPHILS # BLD AUTO: 0.08 X10(3) UL (ref 0–0.2)
BASOPHILS NFR BLD AUTO: 1 %
BUN BLD-MCNC: 5 MG/DL (ref 9–23)
CALCIUM BLD-MCNC: 8.8 MG/DL (ref 8.5–10.1)
CHLORIDE SERPL-SCNC: 111 MMOL/L (ref 98–112)
CO2 SERPL-SCNC: 26 MMOL/L (ref 21–32)
CREAT BLD-MCNC: 0.67 MG/DL
EGFRCR SERPLBLD CKD-EPI 2021: 112 ML/MIN/1.73M2 (ref 60–?)
EOSINOPHIL # BLD AUTO: 0.24 X10(3) UL (ref 0–0.7)
EOSINOPHIL NFR BLD AUTO: 3.1 %
ERYTHROCYTE [DISTWIDTH] IN BLOOD BY AUTOMATED COUNT: 31.2 %
GLUCOSE BLD-MCNC: 65 MG/DL (ref 70–99)
GLUCOSE BLD-MCNC: 91 MG/DL (ref 70–99)
HCT VFR BLD AUTO: 25.7 %
HGB BLD-MCNC: 7.4 G/DL
IMM GRANULOCYTES # BLD AUTO: 0.03 X10(3) UL (ref 0–1)
IMM GRANULOCYTES NFR BLD: 0.4 %
LYMPHOCYTES # BLD AUTO: 1.92 X10(3) UL (ref 1–4)
LYMPHOCYTES NFR BLD AUTO: 24.6 %
MCH RBC QN AUTO: 20.1 PG (ref 26–34)
MCHC RBC AUTO-ENTMCNC: 28.8 G/DL (ref 31–37)
MCV RBC AUTO: 69.6 FL
MONOCYTES # BLD AUTO: 0.65 X10(3) UL (ref 0.1–1)
MONOCYTES NFR BLD AUTO: 8.3 %
NEUTROPHILS # BLD AUTO: 4.9 X10 (3) UL (ref 1.5–7.7)
NEUTROPHILS # BLD AUTO: 4.9 X10(3) UL (ref 1.5–7.7)
NEUTROPHILS NFR BLD AUTO: 62.6 %
OSMOLALITY SERPL CALC.SUM OF ELEC: 291 MOSM/KG (ref 275–295)
PLATELET # BLD AUTO: 815 10(3)UL (ref 150–450)
POTASSIUM SERPL-SCNC: 4.4 MMOL/L (ref 3.5–5.1)
RBC # BLD AUTO: 3.69 X10(6)UL
SODIUM SERPL-SCNC: 143 MMOL/L (ref 136–145)
WBC # BLD AUTO: 7.8 X10(3) UL (ref 4–11)

## 2024-03-27 PROCEDURE — 99231 SBSQ HOSP IP/OBS SF/LOW 25: CPT | Performed by: NEUROLOGICAL SURGERY

## 2024-03-27 NOTE — PROGRESS NOTES
[facility]  Neurosurgery Progress Note    Anna Galindo Patient Status:  Inpatient    1980 MRN [id number]   Location [facility] 7NE-A Attending Haley Ruvalcaba*   Hosp Day # 3 PCP Yenifer Major MD     PRINCIPLE PROBLEM:   C6-T2 syringohydromyelia  Low back pain     SUBJECTIVE     Pt examined, sitting up in bed. Reports persistent, dull frontal and left-sided headache. Denies dizziness, visual disturbance, changes in speech, or focal weakness. She voiced no specific complaints of back pain or LE weakness, numbness, or tingling at this time.    MRI brain does not demonstrate any acute findings or ventriculomegaly, stable when compared to prior imaging. MRI cervical + thoracic spine demonstrates 3 discrete syrinx at C4 (stable from prior imaging), C6-T1, and T1-2 which appear to have progressed since prior MRI C spine in .    OBJECTIVE/PHYSICAL EXAM     VITALS:  /69 (BP Location: Left arm)   Pulse 80   Temp 98.4 °F (36.9 °C) (Oral)   Resp 20   Ht 68\"   Wt 238 lb (108 kg)   SpO2 100%   BMI 36.19 kg/m²     GENERAL: No acute distress, non-toxic appearing, mood appropriate    HEENT: Normocephalic, atraumatic    RESP:  Respirations non-labored, even    CV:  NSR on tele    NEURO: Alert and oriented x3.  Able to follow commands.  Sensation to light touch is intact bilaterally.  SIERRA x 4. Gait deferred.      LABS     No new labs to review  Lab Results   Component Value Date    WBC 7.8 2024    HGB 7.4 2024    HCT 25.7 2024    .0 2024    CREATSERUM 0.67 2024    BUN 5 2024     2024    K 4.4 2024     2024    CO2 26.0 2024    GLU 65 2024    CA 8.8 2024    PGLU 91 2024     IMAGING     No new imaging to review    ASSESSMENT & PLAN     ASSESSMENT:  Cervicothoracic syringohydromyelia   Low back pain with BLE radiculopathy  Hx Chiari malformation s/p suboccipital craniotomy for decompression  in 2004    PLAN:  No acute neurosurgical intervention is indicated  Imaging reviewed, as detailed in HPI. Recommend patient follow-up with Neurosurgeon with whom she is established, Dr. Brito at [place], to discuss imaging findings and to determine next steps.  Migraine/headache management deferred to Neurology  Medical management per hospitalist  Eros to WILLY from a Neurosurgical standpoint. No f/u with our service is indicated    Erica Webb, APRN-NP  [facility]  3/26/2024, 8:39 AM      A total of 10 minutes of visit time (exclusible of billable procedures) was administered.  >50 % of time spent counseling/coordinating care.  Is this a shared or split note between Advanced Practice Provider and Physician? Yes

## 2024-03-27 NOTE — DISCHARGE SUMMARY
[facility] Hospitalist Discharge Summary     Patient ID:  Anna Galindo  43 year old  7/21/1980    Admit date: 3/24/2024    Discharge date and time: 03/27/24     Attending Physician: Haley Ruvalcaba*     Primary Care Physician: Yenifer Major MD     Discharge Diagnoses: Anemia    Please note that only IHP DMG and EMG patients enrolled in the Medicare ACO, BCBS ACO and [facility] HMOs will be handled by the [facility] Care Management team.  For all other patients, please follow usual protocol for discharge care transition.    Discharge Condition: stable    Disposition:  home    Important Follow up:  - PCP within 2 weeks       Follow-up Information       Yenifer Major MD. Call in 1 week(s).    Specialty: Family Medicine  Contact information:  [facility]  [address] [place], [place] [id number] [phone]                                 Hospital Course:        43 year old female with PMH sig for Chiari malformation, migraines, peripheral neuropathy, anemia p/w headache and low back pain. Started about a week ago and also associated with photophobia. Pain radiates down both legs and feels like an electrical shock. Denies visual changes/deficits. Denies N/V. No fevers or neck stiffness. Also reporting urinary incontinence episodes for more than a month. Tried taking topamax with no relief.   In the ED her VSS. Labs with Hgb of 5.3 with microcytosis, platelets of 973. 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 NSG consulted, ordered blood transferusions and admitted for further evaluation and treatment.     Cervical/thoracic syryingohydromyelia with back pain  - Hx Chiari malformation   - await further NSG recommendations - Recommend patient follow-up with Neurosurgeon with whom she is established, Dr. Brito at [place], to discuss  imaging findings and to determine next steps. No acute surgical intervention warranted   - Cervical/thoracic spine MRI reviewed  - prn analgesics     Acute microcytic anemia  Thrombocytosis, reactive   - no evidence of hemorrhage   - s/p 3u PRBC   - monitor  - hematology following, likely has poor Fe absorption after her gastric bypass surgery  - OP hematology f/u for regular Fe infusions      Migraines  Peripheral neuropathy  - gabapentin TID  - her pain meds are likely causing worsening rebound headaches  - prn topamax PTA    Consults: IP CONSULT TO HOSPITALIST  IP CONSULT TO NEUROSURGERY  IP CONSULT TO HEMATOLOGY    Operative Procedures:        Patient instructions:      I as the attending physician reconciled the current and discharge medications on day of discharge.     Current Discharge Medication List        CONTINUE these medications which have NOT CHANGED    Details   !! topiramate 100 MG Oral Tab Take 1 tablet (100 mg total) by mouth 2 (two) times daily.      !! gabapentin 600 MG Oral Tab Take 2 tablets (1,200 mg total) by mouth 3 (three) times daily.      ergocalciferol 1.25 MG (10515 UT) Oral Cap Take 1 capsule (50,000 Units total) by mouth every 7 days.      Ferrous Sulfate (IRON) 325 (65 Fe) MG Oral Tab Take 1 tablet by mouth daily.      !! topiramate 50 MG Oral Tab Take 50 mg by mouth 2 (two) times daily.      HYDROcodone-acetaminophen (NORCO) 5-325 MG Oral Tab as needed.      !! gabapentin (NEURONTIN) 600 MG Oral Tab Take 1 tablet 3 times daily, in addition to 400 mg tablet to equal 1000 mg TID.      gabapentin (NEURONTIN) 400 MG Oral Cap Take 1 tablet 3 times daily, in addition to 600 mg tablet to equal 1000 mg TID.       !! - Potential duplicate medications found. Please discuss with provider.          Activity: activity as tolerated  Diet: regular diet  Wound Care: as directed  Code Status: Prior      Discharge Exam:     General: no acute distress, alert and oriented x 3  Heart: RRR  Lungs: clear  bilaterally, no active wheezing  Abdomen: nontender, nondistended, intact BS  Extremities: no pedal edema   Neuro: CN inact, no focal deficits      Total time coordinating care for discharge: Greater than 30 minutes    Magdalena Ruvalcaba MD  [facility]

## 2024-03-27 NOTE — PROGRESS NOTES
[facility] HEMATOLOGY ONCOLOGY  Progress Note    Anna Galindo Patient Status:  Inpatient    1980 MRN [id number]   Location [facility] 7NE-A Attending Haley Ruvalcaba*   Hosp Day # 3 PCP Yenifer Major MD     ADMISSION DATE AND TIME: 3/24/2024  3:50 PM    ADMIT DIAGNOSIS: Anemia    SUBJECTIVE:    S/p Venofer  She feels well    OBJECTIVE:  Blood pressure 119/64, pulse 83, temperature 98.2 °F (36.8 °C), temperature source Oral, resp. rate 18, height 5' 8\" (1.727 m), weight 238 lb (108 kg), SpO2 100%, not currently breastfeeding.    PHYSICAL EXAM:  General: afebrile, alert and oriented x 3, pleasant  HEENT: oropharynx clear  CV: Regular rate and rhythm  Lungs: CTA  Abdomen: soft, non distended and non tender  Extremity: no edema or cyanosis       LABS:  Recent Results (from the past 24 hour(s))   Prepare RBC Once    Collection Time: 24  6:42 PM   Result Value Ref Range    Blood Product [id number]     Unit Number [id number]     UNIT ABO B     UNIT RH POS     Product Status Issued     Expiration Date [id number]     Blood Type Barcode 7300     Unit Volume 350 ml   Hemoglobin    Collection Time: 24 11:23 PM   Result Value Ref Range    HGB 7.5 (L) 12.0 - 16.0 g/dL   Basic Metabolic Panel (8)    Collection Time: 24  6:52 AM   Result Value Ref Range    Glucose 65 (L) 70 - 99 mg/dL    Sodium 143 136 - 145 mmol/L    Potassium 4.4 3.5 - 5.1 mmol/L    Chloride 111 98 - 112 mmol/L    CO2 26.0 21.0 - 32.0 mmol/L    Anion Gap 6 0 - 18 mmol/L    BUN 5 (L) 9 - 23 mg/dL    Creatinine 0.67 0.55 - 1.02 mg/dL    Calcium, Total 8.8 8.5 - 10.1 mg/dL    Calculated Osmolality 291 275 - 295 mOsm/kg    eGFR-Cr 112 >=60 mL/min/1.73m2   CBC W/ DIFFERENTIAL    Collection Time: 24  6:52 AM   Result Value Ref Range    WBC 7.8 4.0 - 11.0 x10(3) uL    RBC 3.69 (L) 3.80 - 5.30 x10(6)uL    HGB 7.4 (L) 12.0 - 16.0 g/dL    HCT 25.7 (L) 35.0 - 48.0 %    .0 (H) 150.0 -  450.0 10(3)uL    MCV 69.6 (L) 80.0 - 100.0 fL    MCH 20.1 (L) 26.0 - 34.0 pg    MCHC 28.8 (L) 31.0 - 37.0 g/dL    RDW 31.2 %    Neutrophil Absolute Prelim 4.90 1.50 - 7.70 x10 (3) uL    Neutrophil Absolute 4.90 1.50 - 7.70 x10(3) uL    Lymphocyte Absolute 1.92 1.00 - 4.00 x10(3) uL    Monocyte Absolute 0.65 0.10 - 1.00 x10(3) uL    Eosinophil Absolute 0.24 0.00 - 0.70 x10(3) uL    Basophil Absolute 0.08 0.00 - 0.20 x10(3) uL    Immature Granulocyte Absolute 0.03 0.00 - 1.00 x10(3) uL    Neutrophil % 62.6 %    Lymphocyte % 24.6 %    Monocyte % 8.3 %    Eosinophil % 3.1 %    Basophil % 1.0 %    Immature Granulocyte % 0.4 %   POCT Glucose    Collection Time: 03/27/24  7:41 AM   Result Value Ref Range    POC Glucose 91 70 - 99 mg/dL     Lab Results   Component Value Date    HGB 7.4 (L) 03/27/2024    HGB 7.5 (L) 03/26/2024    HGB 6.7 (LL) 03/26/2024     Component      Latest Ref Rng 3/25/2024   RETIC%      0.5 - 2.5 % 0.3 (L)    RETIC ABSOLUTE      22.5 - 147.5 x10(3) uL 12.2 (L)    Retic IRF      0.100 - 0.300 Ratio 0.062 (L)    Reticulocyte Hemoglobin Equivalent      28.2 - 36.6 pg 15.0 (L)    Iron, Serum      50 - 170 ug/dL 13 (L)    Transferrin      200 - 360 mg/dL 305    Iron Bind.Cap.(TIBC)      240 - 450 ug/dL 454 (H)    Iron Saturation      15 - 50 % 3 (L)    FERRITIN      12.0 - 240.0 ng/mL 2.0 (L)    BC Poly Negative       Legend:  (L) Low  (H) High    CULTURES  No results found for this visit on 03/24/24.    IMAGING:    MRI CERVICAL+THORACIC SPINE (ALL W+WO) (CPT=72156/68466)    Result Date: 3/26/2024  CONCLUSION:  Multi focal syrinx at the cervical and upper thoracic level.  This appears to have progressed since 4/28/2012 MRI of the cervical spine.  There are 3 discrete syrinx noted at the C4 level, C6-T1 level and T1-2 level as described above.   LOCATION:  [facility]   Dictated by (CST): Lori Raymond MD on 3/26/2024 at 8:50 PM     Finalized by (CST): Lori Raymond MD on 3/26/2024 at 8:59 PM       MRI BRAIN  (W+WO) (CPT=70553)    Result Date: 3/26/2024  CONCLUSION:  Postsurgical changes of suboccipital craniotomy for decompression of a Chiari malformation.  There is no MR evidence for acute intracranial process.   LOCATION:  [facility]    Dictated by (CST): Lori Raymond MD on 3/26/2024 at 7:07 PM     Finalized by (CST): Lori Raymond MD on 3/26/2024 at 7:10 PM          MEDICATIONS:  Medications reviewed.     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     Anemia  This is microcytic anemia  Reviewed her labs in the system  She always had low hemoglobin for the last 12 years  White count always been on the lower side and the platelets were normal in the past but for the last 3 years they have been elevated.  We checked iron labs and this showed significantly low ferritin and iron saturation  This is therefore consistent with severe iron deficiency  The likely cause of this is malabsorption due to previous gastric bypass surgery  I asked her specifically whether she had this seen hematology in the past and she had no recollection of seeing any hematologist.  However she did mention that she received IV iron in the past at other hospitals.     She is status post PRBC  Status post IV Venofer     Hemoglobin stable 7.4    I explained to her that she will be requiring intermittent iron infusions because of malabsorption due to gastric bypass surgery  We will now follow her as an  outpatient in the clinic and order Venofer infusions to replace her iron stores  Once that is completed then the hemoglobin should become normal    Okay to DC from oncology point of view     Syringohydromyelia  Per neurosurgery      Mateus Chaves MD

## 2024-03-27 NOTE — PLAN OF CARE
Assumed care at 0730   Pt A/O x4   On RA  Up with SB assist   Norco x2 for pain (0834 and 12:36 )   CD of imaging obtained and given to pt  NEAL  called at 0939  Medications, prescriptions, and AVS reviewed with patient and nephew- verbalized understanding   All questions answered   Assisted pt into nephew's car via wheelchair     NURSING DISCHARGE NOTE    Discharged Home via Wheelchair.  Accompanied by Family member and RN  Belongings Taken by patient/family.

## 2024-03-27 NOTE — PLAN OF CARE
Assumed care at approximately 1930.  A & O x 4.   RA.   NSR on tele.   C/o moderate to severe back pain. Pain managed with PRN Norco, PRN morphine and heat packs.   PRBCs x 1 completed. Repeat hgb 7.5.   IVFs infusing.   Call light within reach.   Patient updated on plan of care

## 2024-03-27 NOTE — CM/SW NOTE
SW remained available after NEAL response. Pt tearful, reports high stress due to hospitalization and plan of care. Reports \"a Doctor told me this morning I need stents placed. I already had brain surgery, I didn't get the answers I needed.\" Support provided.  Pt made aware if she were to leave AMA then DC paperwork would not be provided. Wants to ensure that she is able to get the follow up needed. Pt agreed, provided understanding. Apologized for her behavior. JORDAN asked for pt privacy as JORDAN and RN escorted back to room. Offered support.     Pt agreeable to returning in her room for the recommended time as we gather DC information and CDs of her imaging. Assurance provided that as soon as she is able to drive, we will discharge her.     11:00- JORDAN made aware by RN that pt's nephew will be coming to hospital to drive her home, nephew will be coming up to room to assure he will be the one driving.     PEDRITO Carter

## 2024-03-27 NOTE — PLAN OF CARE
Responded to NEAL. Patient wanted to leave the hospital and drive home, but was given morphine 4mg IV at 0723 and Norco 5/325mg at 0834. Told that she could NOT drive home because of the recent opioids and had to either: 1) get a ride home or 2) stay in the hospital for another 4-6 hrs giving her body time to metabolize the opioids. It was emphasized how this was a safety issue- driving under the influence of opioid pain meds.    Social work set her up with a free ride home, but she declined because she didn't have resources to get back to the hospital to  her car. Public safety even offered to get her a free ride back to the hospital 6 hrs from now for her to get her car. She declined and said she was going to walk out of the hospital and drive home. She was informed that public safety would call the [place] police if she got into her car due to concern for driving under the influence.    She then agreed to stay in hospital for another 4-6 hrs before driving home.    She appeared mistrustful of staff and was calm 90% of the time. She got frustrated and raised her voice and yelled at times but was not physically aggressive in any way.     Dr. Castillo Crane

## 2024-03-28 LAB
BLOOD TYPE BARCODE: 7300
UNIT VOLUME: 350 ML

## 2024-03-28 NOTE — PAYOR COMM NOTE
--------------  DISCHARGE REVIEW    Payor: [employer]  Subscriber #:  [id number]  Authorization Number: [id number]    Admit date: 3/24/24  Admit time:   9:15 PM  Discharge Date: 3/27/2024 12:48 PM     Admitting Physician: Haley Ruvalcaba MD  Attending Physician:  No att. providers found  Primary Care Physician: Yenifer Major MD          Discharge Summary Notes        Discharge Summary signed by Haley Ruvalcaba MD at 3/27/2024 11:27 AM       Author: Haley Ruvalcaba MD Specialty: Internal Medicine Author Type: Physician    Filed: 3/27/2024 11:27 AM Date of Service: 3/27/2024  9:42 AM Status: Signed    : Haley Ruvalcaba MD (Physician)                                                          [facility] Hospitalist Discharge Summary     Patient ID:  Anna Galindo  43 year old  7/21/1980    Admit date: 3/24/2024    Discharge date and time: 03/27/24     Attending Physician: Haley Ruvalcaba*     Primary Care Physician: Yenifer Major MD     Discharge Diagnoses: Anemia    Please note that only IHP DMG and EMG patients enrolled in the Medicare ACO, BCBS ACO and [facility] HMOs will be handled by the [facility] Care Management team.  For all other patients, please follow usual protocol for discharge care transition.    Discharge Condition: stable    Disposition:  home    Important Follow up:  - PCP within 2 weeks       Follow-up Information       Yenifer Major MD. Call in 1 week(s).    Specialty: Family Medicine  Contact information:  [facility]   [address] [place] [id number] [phone]                                 Hospital Course:        43 year old female with PMH sig for Chiari malformation, migraines, peripheral neuropathy, anemia p/w headache and low back pain. Started about a week ago and also associated with photophobia. Pain radiates down both legs and feels like an electrical shock. Denies visual changes/deficits. Denies N/V. No fevers  or neck stiffness. Also reporting urinary incontinence episodes for more than a month. Tried taking topamax with no relief.   In the ED her VSS. Labs with Hgb of 5.3 with microcytosis, platelets of 973. 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 NSG consulted, ordered blood transferusions and admitted for further evaluation and treatment.     Cervical/thoracic syryingohydromyelia with back pain  - Hx Chiari malformation   - await further NSG recommendations - Recommend patient follow-up with Neurosurgeon with whom she is established, Dr. Brito at [facility], to discuss imaging findings and to determine next steps. No acute surgical intervention warranted   - Cervical/thoracic spine MRI reviewed  - prn analgesics     Acute microcytic anemia  Thrombocytosis, reactive   - no evidence of hemorrhage   - s/p 3u PRBC   - monitor  - hematology following, likely has poor Fe absorption after her gastric bypass surgery  - OP hematology f/u for regular Fe infusions      Migraines  Peripheral neuropathy  - gabapentin TID  - her pain meds are likely causing worsening rebound headaches  - prn topamax PTA    Consults: IP CONSULT TO HOSPITALIST  IP CONSULT TO NEUROSURGERY  IP CONSULT TO HEMATOLOGY    Operative Procedures:        Patient instructions:      I as the attending physician reconciled the current and discharge medications on day of discharge.     Current Discharge Medication List        CONTINUE these medications which have NOT CHANGED    Details   !! topiramate 100 MG Oral Tab Take 1 tablet (100 mg total) by mouth 2 (two) times daily.      !! gabapentin 600 MG Oral Tab Take 2 tablets (1,200 mg total) by mouth 3 (three) times daily.      ergocalciferol 1.25 MG (84504 UT) Oral Cap Take 1 capsule (50,000 Units total) by mouth every 7 days.      Ferrous Sulfate (IRON) 325 (65 Fe) MG Oral Tab Take 1 tablet by  mouth daily.      !! topiramate 50 MG Oral Tab Take 50 mg by mouth 2 (two) times daily.      HYDROcodone-acetaminophen (NORCO) 5-325 MG Oral Tab as needed.      !! gabapentin (NEURONTIN) 600 MG Oral Tab Take 1 tablet 3 times daily, in addition to 400 mg tablet to equal 1000 mg TID.      gabapentin (NEURONTIN) 400 MG Oral Cap Take 1 tablet 3 times daily, in addition to 600 mg tablet to equal 1000 mg TID.       !! - Potential duplicate medications found. Please discuss with provider.          Activity: activity as tolerated  Diet: regular diet  Wound Care: as directed  Code Status: Prior      Discharge Exam:     General: no acute distress, alert and oriented x 3  Heart: RRR  Lungs: clear bilaterally, no active wheezing  Abdomen: nontender, nondistended, intact BS  Extremities: no pedal edema   Neuro: CN inact, no focal deficits      Total time coordinating care for discharge: Greater than 30 minutes    Magdalena Ruvalcaba MD  [facility] Hospitalist        Electronically signed by Haley Ruvalcaba MD on 3/27/2024 11:27 AM         REVIEWER COMMENTS

## 2024-11-22 PROCEDURE — 99284 EMERGENCY DEPT VISIT MOD MDM: CPT

## 2024-11-23 ENCOUNTER — HOSPITAL ENCOUNTER (EMERGENCY)
Facility: HOSPITAL | Age: 44
Discharge: LEFT BEFORE DISCHARGE | End: 2024-11-23
Attending: EMERGENCY MEDICINE
Payer: MEDICAID

## 2024-11-23 VITALS
RESPIRATION RATE: 18 BRPM | HEIGHT: 68 IN | DIASTOLIC BLOOD PRESSURE: 82 MMHG | HEART RATE: 68 BPM | WEIGHT: 250 LBS | BODY MASS INDEX: 37.89 KG/M2 | TEMPERATURE: 98 F | SYSTOLIC BLOOD PRESSURE: 128 MMHG | OXYGEN SATURATION: 99 %

## 2024-11-23 DIAGNOSIS — G43.801 OTHER MIGRAINE WITH STATUS MIGRAINOSUS, NOT INTRACTABLE: Primary | ICD-10-CM

## 2024-11-23 PROCEDURE — 96375 TX/PRO/DX INJ NEW DRUG ADDON: CPT

## 2024-11-23 PROCEDURE — 96365 THER/PROPH/DIAG IV INF INIT: CPT

## 2024-11-23 RX ORDER — KETOROLAC TROMETHAMINE 15 MG/ML
15 INJECTION, SOLUTION INTRAMUSCULAR; INTRAVENOUS ONCE
Status: COMPLETED | OUTPATIENT
Start: 2024-11-23 | End: 2024-11-23

## 2024-11-23 RX ORDER — MAGNESIUM SULFATE HEPTAHYDRATE 40 MG/ML
2 INJECTION, SOLUTION INTRAVENOUS ONCE
Status: COMPLETED | OUTPATIENT
Start: 2024-11-23 | End: 2024-11-23

## 2024-11-23 RX ORDER — PROCHLORPERAZINE EDISYLATE 5 MG/ML
10 INJECTION INTRAMUSCULAR; INTRAVENOUS ONCE
Status: COMPLETED | OUTPATIENT
Start: 2024-11-23 | End: 2024-11-23

## 2024-11-23 RX ORDER — DEXAMETHASONE SODIUM PHOSPHATE 10 MG/ML
10 INJECTION, SOLUTION INTRAMUSCULAR; INTRAVENOUS ONCE
Status: COMPLETED | OUTPATIENT
Start: 2024-11-23 | End: 2024-11-23

## 2024-11-23 RX ORDER — DIPHENHYDRAMINE HYDROCHLORIDE 50 MG/ML
50 INJECTION INTRAMUSCULAR; INTRAVENOUS ONCE
Status: COMPLETED | OUTPATIENT
Start: 2024-11-23 | End: 2024-11-23

## 2024-11-23 NOTE — ED INITIAL ASSESSMENT (HPI)
Pt presents with c/o headache for the past couple days.   Pt AO x 4, answering questions appropriately in triage.   Hx of migraines. Pt states, she \"took a Norco about 6 hrs PTA but it is not helping.\"

## 2024-11-23 NOTE — ED PROVIDER NOTES
Patient Seen in: [facility] Emergency Department      History     Chief Complaint   Patient presents with    Headache     Stated Complaint: headache \"couple of days\", tingling to arms and feet x 1 week    Subjective:   44-year-old female, history of chronic migraines, neuropathy, presents with migraine headache x 48 hours.  Says she took her Norco tens with little relief.  Takes all her medications for migraine, states she gets migraines frequently.  Gradual onset, not thunderclap, not the worst headache of her life.  No red flag symptoms for subarachnoid hemorrhage.  No fevers.  No neck rigidity.  No blurred vision.  No numbness or focal weakness.  No ataxia.              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   Tobacco Use    Smoking status: Never    Smokeless tobacco: Never   Vaping Use    Vaping status: Never Used   Substance and Sexual Activity    Alcohol use: No    Drug use: No   Other Topics Concern    Caffeine Concern No     Comment: occasional soda    Exercise No     Social Drivers of Health     Financial Resource Strain: Not on File (2024)    Received from [facility]    Financial Resource Strain     Financial Resource Strain: 0   Food Insecurity: Not on File (2024)    Received from [facility]    Food Insecurity     Food: 0   Transportation Needs: Not on File (2024)    Received from [facility]    Transportation Needs     Transportation: 0   Physical Activity:  Not on File (2024)    Received from [facility]    Physical Activity     Physical Activity: 0   Stress: Not on File (2024)    Received from [facility]    Stress     Stress: 0   Social Connections: Not on File (2024)    Received from [facility]    Social Connections     Connectedness: 0   Housing Stability: Not on File (2024)    Received from [facility]    Housing Stability     Housin                  Physical Exam     ED Triage Vitals [24 0006]   /82   Pulse 68   Resp 18   Temp 98 °F (36.7 °C)   Temp src Oral   SpO2 99 %   O2 Device None (Room air)       Current Vitals:   Vital Signs  BP: 128/82  Pulse: 68  Resp: 18  Temp: 98 °F (36.7 °C)  Temp src: Oral  MAP (mmHg): 97    Oxygen Therapy  SpO2: 99 %  O2 Device: None (Room air)        Physical Exam  Vitals and nursing note reviewed.   Constitutional:       Appearance: She is not toxic-appearing.   HENT:      Head: Normocephalic and atraumatic.      Nose: Nose normal.      Mouth/Throat:      Mouth: Mucous membranes are moist.   Eyes:      Extraocular Movements: Extraocular movements intact.      Pupils: Pupils are equal, round, and reactive to light.   Cardiovascular:      Rate and Rhythm: Normal rate.      Heart sounds: Normal heart sounds.   Pulmonary:      Effort: Pulmonary effort is normal. No respiratory distress.      Breath sounds: Normal breath sounds.   Musculoskeletal:         General: Normal range of motion.      Cervical back: Normal range of motion and neck supple.   Skin:     General: Skin is warm and dry.   Neurological:      General: No focal deficit present.      Mental Status: She is alert and oriented to person, place, and time.      Cranial Nerves: No cranial nerve deficit.   Psychiatric:         Mood and Affect: Mood normal.         Behavior: Behavior normal.             ED Course   Labs Reviewed - No data to display                MDM      External chart review demonstrates admission to [facility] within the last month  for migraine headache, had imaging done which was benign.    Differential diagnosis includes, but not limited to, migraine headache, tension headache, dehydration, infection    44-year-old female with migraine headache, exactly similar to her previous migraines.  No red flag symptoms for subarachnoid hemorrhage.  Neuroexam is normal.  Feeling much better.  Given IV fluids, IV Decadron, IV Benadryl, IV Compazine, IV Toradol and IV magnesium sulfate.  Feeling much better.  Has neurology follow-up as an outpatient given EMG neuro as needed.  Vital signs stable.  Repeat neuroexam is stable.  Feeling much better would like to discharge home after completion of her magnesium gtt.      Went to go reassess the patient prior to discharge and make sure she was feeling better she is not in the room.  Nursing staff said that they saw her walking down the hallway, staff ran after her, she had an IV in her arm.  Stated she likes was feeling better and she wanted to leave.  We requested her to come back to take out the IV and she used profanity towards staff and the left.  Police will be called as she likely has an IV in her arm.  She would not stop to come back get the IV taken out.    Medical Decision Making      Disposition and Plan     Clinical Impression:  1. Other migraine with status migrainosus, not intractable         Disposition:  [place]  11/23/2024  6:23 am    Follow-up:  [facility], [address] [place] [address] [place] [id number] [phone]  Follow up  As needed          Medications Prescribed:  Current Discharge Medication List              Supplementary Documentation:

## (undated) NOTE — ED AVS SNAPSHOT
[facility] Emergency Department    [place]  [address]    [address] [place] [id number]    Phone:  [phone]    Fax:  Sybil Parks   MRN: [id number]    Department:  [facility] Emergency Department   Date of Visit:  6 IF THERE IS ANY CHANGE OR WORSENING OF YOUR CONDITION, CALL YOUR PRIMARY CARE PHYSICIAN AT ONCE OR RETURN IMMEDIATELY TO THE EMERGENCY DEPARTMENT.     If you have been prescribed any medication(s), please fill your prescription right away and begin taking t

## (undated) NOTE — ED AVS SNAPSHOT
[facility] Emergency Department    [address] [place] [address]    Phone:  [phone]    Fax:  Sybil Tomlin Kasey   MRN: [id number]    Department:  [facility] Emergency Department   Date of Visit:  6 Medication List      Notice     You have not been prescribed any medications.             Discharge References/Attachments     HEADACHE, MIGRAINE, CLASSIC (ENGLISH)      Disclosure     Insurance plans vary and the physician(s) referred by the ER may not be CARE PHYSICIAN AT ONCE OR RETURN IMMEDIATELY TO THE EMERGENCY DEPARTMENT.     If you have been prescribed any medication(s), please fill your prescription right away and begin taking the medication(s) as directed    If the emergency physician has read X-ray coverage. Patient [address] is a Federal [employer] program that can help with your Affordable Care Act coverage, as well as all types of Medicaid plans.   To get signed up and covered, please call [phone] and ask to get set up for an insuran